# Patient Record
Sex: FEMALE | Race: WHITE | Employment: OTHER | URBAN - METROPOLITAN AREA
[De-identification: names, ages, dates, MRNs, and addresses within clinical notes are randomized per-mention and may not be internally consistent; named-entity substitution may affect disease eponyms.]

---

## 2020-01-09 ENCOUNTER — HOSPITAL ENCOUNTER (INPATIENT)
Age: 70
LOS: 2 days | Discharge: HOME | DRG: 871 | End: 2020-01-11
Attending: EMERGENCY MEDICINE | Admitting: FAMILY MEDICINE
Payer: COMMERCIAL

## 2020-01-09 ENCOUNTER — APPOINTMENT (OUTPATIENT)
Dept: GENERAL RADIOLOGY | Age: 70
DRG: 871 | End: 2020-01-09
Attending: NURSE PRACTITIONER
Payer: COMMERCIAL

## 2020-01-09 ENCOUNTER — APPOINTMENT (OUTPATIENT)
Dept: CT IMAGING | Age: 70
DRG: 871 | End: 2020-01-09
Attending: EMERGENCY MEDICINE
Payer: COMMERCIAL

## 2020-01-09 ENCOUNTER — APPOINTMENT (OUTPATIENT)
Dept: GENERAL RADIOLOGY | Age: 70
DRG: 871 | End: 2020-01-09
Attending: FAMILY MEDICINE
Payer: COMMERCIAL

## 2020-01-09 DIAGNOSIS — N12 PYELONEPHRITIS: ICD-10-CM

## 2020-01-09 DIAGNOSIS — D72.9 NEUTROPHILIA: ICD-10-CM

## 2020-01-09 DIAGNOSIS — N10 ACUTE PYELONEPHRITIS: ICD-10-CM

## 2020-01-09 DIAGNOSIS — R11.2 NON-INTRACTABLE VOMITING WITH NAUSEA, UNSPECIFIED VOMITING TYPE: ICD-10-CM

## 2020-01-09 DIAGNOSIS — R77.8 ELEVATED TROPONIN: Primary | ICD-10-CM

## 2020-01-09 DIAGNOSIS — N28.89 LEFT RENAL MASS: ICD-10-CM

## 2020-01-09 DIAGNOSIS — D72.829 LEUKOCYTOSIS, UNSPECIFIED TYPE: ICD-10-CM

## 2020-01-09 DIAGNOSIS — C79.51 BONE METASTASES (HCC): ICD-10-CM

## 2020-01-09 DIAGNOSIS — R10.30 LOWER ABDOMINAL PAIN: ICD-10-CM

## 2020-01-09 LAB
ALBUMIN SERPL-MCNC: 3 G/DL (ref 3.5–5)
ALBUMIN/GLOB SERPL: 0.8 {RATIO} (ref 1.1–2.2)
ALP SERPL-CCNC: 134 U/L (ref 45–117)
ALT SERPL-CCNC: 36 U/L (ref 12–78)
ANION GAP SERPL CALC-SCNC: 13 MMOL/L (ref 5–15)
APPEARANCE UR: CLEAR
AST SERPL-CCNC: 24 U/L (ref 15–37)
ATRIAL RATE: 96 BPM
BACTERIA URNS QL MICRO: ABNORMAL /HPF
BASOPHILS # BLD: 0 K/UL (ref 0–0.1)
BASOPHILS NFR BLD: 0 % (ref 0–1)
BILIRUB SERPL-MCNC: 0.9 MG/DL (ref 0.2–1)
BILIRUB UR QL: NEGATIVE
BUN SERPL-MCNC: 12 MG/DL (ref 6–20)
BUN/CREAT SERPL: 16 (ref 12–20)
CALCIUM SERPL-MCNC: 8.8 MG/DL (ref 8.5–10.1)
CALCULATED P AXIS, ECG09: 39 DEGREES
CALCULATED R AXIS, ECG10: -28 DEGREES
CALCULATED T AXIS, ECG11: 29 DEGREES
CHLORIDE SERPL-SCNC: 103 MMOL/L (ref 97–108)
CHOLEST SERPL-MCNC: 190 MG/DL
CO2 SERPL-SCNC: 19 MMOL/L (ref 21–32)
COLOR UR: ABNORMAL
CREAT SERPL-MCNC: 0.76 MG/DL (ref 0.55–1.02)
DIAGNOSIS, 93000: NORMAL
DIFFERENTIAL METHOD BLD: ABNORMAL
EOSINOPHIL # BLD: 0 K/UL (ref 0–0.4)
EOSINOPHIL NFR BLD: 0 % (ref 0–7)
EPITH CASTS URNS QL MICRO: ABNORMAL /LPF
ERYTHROCYTE [DISTWIDTH] IN BLOOD BY AUTOMATED COUNT: 13.4 % (ref 11.5–14.5)
FLUAV AG NPH QL IA: NEGATIVE
FLUBV AG NOSE QL IA: NEGATIVE
GLOBULIN SER CALC-MCNC: 3.7 G/DL (ref 2–4)
GLUCOSE SERPL-MCNC: 106 MG/DL (ref 65–100)
GLUCOSE UR STRIP.AUTO-MCNC: NEGATIVE MG/DL
HCT VFR BLD AUTO: 44 % (ref 35–47)
HDLC SERPL-MCNC: 42 MG/DL
HDLC SERPL: 4.5 {RATIO} (ref 0–5)
HGB BLD-MCNC: 14.8 G/DL (ref 11.5–16)
HGB UR QL STRIP: ABNORMAL
HYALINE CASTS URNS QL MICRO: ABNORMAL /LPF (ref 0–5)
IMM GRANULOCYTES # BLD AUTO: 0 K/UL (ref 0–0.04)
IMM GRANULOCYTES NFR BLD AUTO: 0 % (ref 0–0.5)
KETONES UR QL STRIP.AUTO: 40 MG/DL
LACTATE SERPL-SCNC: 1.1 MMOL/L (ref 0.4–2)
LDLC SERPL CALC-MCNC: 124.6 MG/DL (ref 0–100)
LEUKOCYTE ESTERASE UR QL STRIP.AUTO: ABNORMAL
LIPASE SERPL-CCNC: 68 U/L (ref 73–393)
LIPID PROFILE,FLP: ABNORMAL
LYMPHOCYTES # BLD: 2.1 K/UL (ref 0.8–3.5)
LYMPHOCYTES NFR BLD: 8 % (ref 12–49)
MCH RBC QN AUTO: 29.8 PG (ref 26–34)
MCHC RBC AUTO-ENTMCNC: 33.6 G/DL (ref 30–36.5)
MCV RBC AUTO: 88.5 FL (ref 80–99)
MONOCYTES # BLD: 1.8 K/UL (ref 0–1)
MONOCYTES NFR BLD: 7 % (ref 5–13)
NEUTS BAND NFR BLD MANUAL: 2 %
NEUTS SEG # BLD: 22.1 K/UL (ref 1.8–8)
NEUTS SEG NFR BLD: 83 % (ref 32–75)
NITRITE UR QL STRIP.AUTO: POSITIVE
NRBC # BLD: 0 K/UL (ref 0–0.01)
NRBC BLD-RTO: 0 PER 100 WBC
P-R INTERVAL, ECG05: 146 MS
PH UR STRIP: 5.5 [PH] (ref 5–8)
PLATELET # BLD AUTO: 400 K/UL (ref 150–400)
PMV BLD AUTO: 10.2 FL (ref 8.9–12.9)
POTASSIUM SERPL-SCNC: 4 MMOL/L (ref 3.5–5.1)
PROT SERPL-MCNC: 6.7 G/DL (ref 6.4–8.2)
PROT UR STRIP-MCNC: 30 MG/DL
Q-T INTERVAL, ECG07: 320 MS
QRS DURATION, ECG06: 82 MS
QTC CALCULATION (BEZET), ECG08: 404 MS
RBC # BLD AUTO: 4.97 M/UL (ref 3.8–5.2)
RBC #/AREA URNS HPF: ABNORMAL /HPF (ref 0–5)
RBC MORPH BLD: ABNORMAL
SODIUM SERPL-SCNC: 135 MMOL/L (ref 136–145)
SP GR UR REFRACTOMETRY: 1 (ref 1–1.03)
TRIGL SERPL-MCNC: 117 MG/DL (ref ?–150)
TROPONIN I SERPL-MCNC: 0.07 NG/ML
TROPONIN I SERPL-MCNC: 0.12 NG/ML
UROBILINOGEN UR QL STRIP.AUTO: 0.2 EU/DL (ref 0.2–1)
VENTRICULAR RATE, ECG03: 96 BPM
VLDLC SERPL CALC-MCNC: 23.4 MG/DL
WBC # BLD AUTO: 26 K/UL (ref 3.6–11)
WBC URNS QL MICRO: >100 /HPF (ref 0–4)

## 2020-01-09 PROCEDURE — 84484 ASSAY OF TROPONIN QUANT: CPT

## 2020-01-09 PROCEDURE — 93005 ELECTROCARDIOGRAM TRACING: CPT

## 2020-01-09 PROCEDURE — 74011250636 HC RX REV CODE- 250/636: Performed by: NURSE PRACTITIONER

## 2020-01-09 PROCEDURE — 70220 X-RAY EXAM OF SINUSES: CPT

## 2020-01-09 PROCEDURE — 83605 ASSAY OF LACTIC ACID: CPT

## 2020-01-09 PROCEDURE — 74177 CT ABD & PELVIS W/CONTRAST: CPT

## 2020-01-09 PROCEDURE — 87804 INFLUENZA ASSAY W/OPTIC: CPT

## 2020-01-09 PROCEDURE — 85025 COMPLETE CBC W/AUTO DIFF WBC: CPT

## 2020-01-09 PROCEDURE — 80053 COMPREHEN METABOLIC PANEL: CPT

## 2020-01-09 PROCEDURE — 99284 EMERGENCY DEPT VISIT MOD MDM: CPT

## 2020-01-09 PROCEDURE — 0100U RESPIRATORY PANEL,PCR,NASOPHARYNGEAL: CPT

## 2020-01-09 PROCEDURE — 74011250637 HC RX REV CODE- 250/637: Performed by: FAMILY MEDICINE

## 2020-01-09 PROCEDURE — 87077 CULTURE AEROBIC IDENTIFY: CPT

## 2020-01-09 PROCEDURE — 74011250636 HC RX REV CODE- 250/636: Performed by: EMERGENCY MEDICINE

## 2020-01-09 PROCEDURE — 74011636320 HC RX REV CODE- 636/320: Performed by: INTERNAL MEDICINE

## 2020-01-09 PROCEDURE — 74011250636 HC RX REV CODE- 250/636: Performed by: FAMILY MEDICINE

## 2020-01-09 PROCEDURE — 80061 LIPID PANEL: CPT

## 2020-01-09 PROCEDURE — 74011000250 HC RX REV CODE- 250: Performed by: EMERGENCY MEDICINE

## 2020-01-09 PROCEDURE — 87086 URINE CULTURE/COLONY COUNT: CPT

## 2020-01-09 PROCEDURE — 36415 COLL VENOUS BLD VENIPUNCTURE: CPT

## 2020-01-09 PROCEDURE — 87186 SC STD MICRODIL/AGAR DIL: CPT

## 2020-01-09 PROCEDURE — 83690 ASSAY OF LIPASE: CPT

## 2020-01-09 PROCEDURE — 71046 X-RAY EXAM CHEST 2 VIEWS: CPT

## 2020-01-09 PROCEDURE — 65270000029 HC RM PRIVATE

## 2020-01-09 PROCEDURE — 87040 BLOOD CULTURE FOR BACTERIA: CPT

## 2020-01-09 PROCEDURE — 81001 URINALYSIS AUTO W/SCOPE: CPT

## 2020-01-09 RX ORDER — SODIUM CHLORIDE 0.9 % (FLUSH) 0.9 %
5-40 SYRINGE (ML) INJECTION AS NEEDED
Status: DISCONTINUED | OUTPATIENT
Start: 2020-01-09 | End: 2020-01-11 | Stop reason: HOSPADM

## 2020-01-09 RX ORDER — GUAIFENESIN 100 MG/5ML
81 LIQUID (ML) ORAL DAILY
Status: DISCONTINUED | OUTPATIENT
Start: 2020-01-09 | End: 2020-01-11 | Stop reason: HOSPADM

## 2020-01-09 RX ORDER — ONDANSETRON 2 MG/ML
4 INJECTION INTRAMUSCULAR; INTRAVENOUS
Status: DISCONTINUED | OUTPATIENT
Start: 2020-01-09 | End: 2020-01-11 | Stop reason: HOSPADM

## 2020-01-09 RX ORDER — SODIUM CHLORIDE 9 MG/ML
75 INJECTION, SOLUTION INTRAVENOUS CONTINUOUS
Status: DISCONTINUED | OUTPATIENT
Start: 2020-01-09 | End: 2020-01-11 | Stop reason: HOSPADM

## 2020-01-09 RX ORDER — SODIUM CHLORIDE 0.9 % (FLUSH) 0.9 %
5-40 SYRINGE (ML) INJECTION EVERY 8 HOURS
Status: DISCONTINUED | OUTPATIENT
Start: 2020-01-09 | End: 2020-01-11 | Stop reason: HOSPADM

## 2020-01-09 RX ORDER — HEPARIN SODIUM 5000 [USP'U]/ML
5000 INJECTION, SOLUTION INTRAVENOUS; SUBCUTANEOUS EVERY 8 HOURS
Status: DISCONTINUED | OUTPATIENT
Start: 2020-01-09 | End: 2020-01-11 | Stop reason: HOSPADM

## 2020-01-09 RX ORDER — GUAIFENESIN 600 MG/1
600 TABLET, EXTENDED RELEASE ORAL EVERY 12 HOURS
Status: DISCONTINUED | OUTPATIENT
Start: 2020-01-09 | End: 2020-01-11 | Stop reason: HOSPADM

## 2020-01-09 RX ORDER — PSEUDOEPHEDRINE HCL 30 MG
60 TABLET ORAL
COMMUNITY

## 2020-01-09 RX ORDER — ACETAMINOPHEN 325 MG/1
650 TABLET ORAL
Status: DISCONTINUED | OUTPATIENT
Start: 2020-01-09 | End: 2020-01-11 | Stop reason: HOSPADM

## 2020-01-09 RX ADMIN — ONDANSETRON 4 MG: 2 INJECTION INTRAMUSCULAR; INTRAVENOUS at 20:57

## 2020-01-09 RX ADMIN — IOPAMIDOL 100 ML: 755 INJECTION, SOLUTION INTRAVENOUS at 16:22

## 2020-01-09 RX ADMIN — SODIUM CHLORIDE 1000 ML: 900 INJECTION, SOLUTION INTRAVENOUS at 15:44

## 2020-01-09 RX ADMIN — WATER 1 G: 1 INJECTION INTRAMUSCULAR; INTRAVENOUS; SUBCUTANEOUS at 19:02

## 2020-01-09 RX ADMIN — ASPIRIN 81 MG 81 MG: 81 TABLET ORAL at 19:02

## 2020-01-09 NOTE — ED NOTES
2:59 PM  I have evaluated the patient as the Provider in Triage. I have reviewed Her vital signs and the triage nurse assessment. I have talked with the patient and any available family and advised that I am the provider in triage and have ordered the appropriate study to initiate their work up based on the clinical presentation during my assessment. I have advised that the patient will be accommodated in the Main ED as soon as possible. I have also requested to contact the triage nurse or myself immediately if the patient experiences any changes in their condition during this brief waiting period. C/o fever, chills, nausea and vomiting. States fever started two weeks ago and now started with n/v/d yesterday. Had flu shot. No ill contacts. Positive SOB, no chest pain.   Beulah Bender, NP

## 2020-01-09 NOTE — ED PROVIDER NOTES
81 y.o. female with no significant past medical history who presents from home with chief complaint of fever. Patient reports her fever onset ~2 weeks ago. Patient endorses accompanying SOB, dizziness, chills, productive cough, and states today she had onset of diarrhea (4x episodes), loss of appetite, and vomiting. Patient notes she also has been fighting a sinus infx for ~4 months, reporting she sees ENT on Monday (in ~4 days). Patient denies recent sick contacts. Patient denies use of anticoagulants. Patient denies urgency, frequency, chest pain, and abdominal pain. There are no other acute medical concerns at this time. Note written by Nedra Huggins, as dictated by Acacia Ashraf NP 2:55 PM     I have evaluated the patient as the Provider in Triage. I have reviewed Her vital signs and the triage nurse assessment. I have talked with the patient and any available family and advised that I am the provider in triage and have ordered the appropriate study to initiate their work up based on the clinical presentation during my assessment. I have advised that the patient will be accommodated in the Main ED as soon as possible. I have also requested to contact the triage nurse or myself immediately if the patient experiences any changes in their condition during this brief waiting period. Note written by Nedra Huggins, as dictated by Acacia Ashraf NP 2:55 PM     ---  81 y.o. female with no significant past medical history who presents from home with chief complaint of Fever. Pt c/o a fever that has been present for three days since Tuesday (Tmax 101) with associated chills, nausea, vomiting, diarrhea, abdominal pain, decreased appetite, and decreased PO intake. Pt describes her diarrhea as watery and yellow and reports four episodes today. Pt states that her abdominal pain in diffuse. Pt mentions sinus drainage for four months with associated cough.  Pt reports taking two courses of antibiotics and a course of prednisone prescribed at [facility] with no improvement in symptoms. Pt states that she has an appointment scheduled with an ENT for 1/13/2020. Pt denies any recent sick contact or travel. Pt denies any other pain. There are no other acute medical concerns at this time. Surgical Hx: Hx cholecystectomy. PCP: No primary care provider on file. Note written by Nedra Lopez, as dictated by Santiago Boucher MD 4:04 PM        The history is provided by the patient. No  was used. No past medical history on file. No past surgical history on file. No family history on file.     Social History     Socioeconomic History    Marital status: Not on file     Spouse name: Not on file    Number of children: Not on file    Years of education: Not on file    Highest education level: Not on file   Occupational History    Not on file   Social Needs    Financial resource strain: Not on file    Food insecurity:     Worry: Not on file     Inability: Not on file    Transportation needs:     Medical: Not on file     Non-medical: Not on file   Tobacco Use    Smoking status: Not on file   Substance and Sexual Activity    Alcohol use: Not on file    Drug use: Not on file    Sexual activity: Not on file   Lifestyle    Physical activity:     Days per week: Not on file     Minutes per session: Not on file    Stress: Not on file   Relationships    Social connections:     Talks on phone: Not on file     Gets together: Not on file     Attends Je[employer] witness service: Not on file     Active member of club or organization: Not on file     Attends meetings of clubs or organizations: Not on file     Relationship status: Not on file    Intimate partner violence:     Fear of current or ex partner: Not on file     Emotionally abused: Not on file     Physically abused: Not on file     Forced sexual activity: Not on file   Other Topics Concern    Not on file   Social History Narrative    Not on file         ALLERGIES: Patient has no allergy information on record. Review of Systems   Constitutional: Positive for appetite change, chills and fever. HENT: Negative for drooling, nosebleeds and sinus pain. Eyes: Negative for pain and itching. Respiratory: Negative for choking and stridor. Cardiovascular: Negative for chest pain and leg swelling. Gastrointestinal: Positive for abdominal pain, diarrhea, nausea and vomiting. Negative for abdominal distention and rectal pain. Endocrine: Negative for heat intolerance and polyphagia. Genitourinary: Negative for enuresis and genital sores. Musculoskeletal: Negative for arthralgias and joint swelling. Skin: Negative for color change. Allergic/Immunologic: Negative for immunocompromised state. Neurological: Negative for tremors, speech difficulty and headaches. Hematological: Negative for adenopathy. Psychiatric/Behavioral: Negative for dysphoric mood and sleep disturbance. There were no vitals filed for this visit. Physical Exam  Vitals signs and nursing note reviewed. Constitutional:       Appearance: She is well-developed. HENT:      Head: Normocephalic. Nose: Nose normal.   Eyes:      Conjunctiva/sclera: Conjunctivae normal.   Neck:      Musculoskeletal: Normal range of motion and neck supple. Cardiovascular:      Rate and Rhythm: Regular rhythm. Heart sounds: Normal heart sounds. Pulmonary:      Effort: Pulmonary effort is normal. No respiratory distress. Abdominal:      General: There is no distension. Palpations: Abdomen is soft. Tenderness: There is tenderness in the right lower quadrant and left lower quadrant. Comments: Diffuse tenderness to palpation in the lower abdomen, non-peritoneal   Musculoskeletal: Normal range of motion. General: No deformity. Skin:     General: Skin is warm and dry. Neurological:      Mental Status: She is alert. Coordination: Coordination normal.   Psychiatric:         Behavior: Behavior normal.     Note written by Nedra Camacho, as dictated by Barbara Bhagat MD 4:04 PM    MDM  Number of Diagnoses or Management Options  Elevated troponin:   Left renal mass:   Leukocytosis, unspecified type:   Lower abdominal pain:   Non-intractable vomiting with nausea, unspecified vomiting type:   Pyelonephritis:   Diagnosis management comments: Pt is fatigued appearing, AAO x 4 notes N/V/D and lower abdominal pain. Denying vaginal or urinary symptoms to me. Fever yesterday but afebrile in ED, non toxic appearing. No acute ischemia on EKG and pt did not c/o chest pain during initial evaluation. When questioned after elevated troponin noted, she said she felt chest pain with cough earlier in week. CT scan results discussed with her. IV abx for leukocytosis with ?pyelonephritis. Will be admitted for further management. ED Course as of Jan 09 1745   Thu Jan 09, 2020   1521 ED EKG interpretation:  Rhythm: normal sinus rhythm; and regular . Rate (approx.): 96; Axis: normal; ST/T wave: normal; No evidence of acute coronary ischemia. [AL]   9763 Pt denies any chest pain while in the ED. No increased WOB. No acute ischemia on EKG. Elevated troponin noted. Discussed with radiologist. ?renal carcinoma vs pyelonephritis noted on CT scan. [AL]   5274 Pt notes currently on steroids for sinus infection per urgent care. Leukocytosis noted. [AL]      ED Course User Index  [AL] Barbara Bhagat MD       Procedures    Hospitalist Perfect Serve for Admission  5:45 PM    ED Room Number: ER03/03  Patient Name and age:  Aileen Montaño 81 y.o.  female  Working Diagnosis:   1. Elevated troponin    2. Leukocytosis, unspecified type    3. Non-intractable vomiting with nausea, unspecified vomiting type    4. Lower abdominal pain    5. Pyelonephritis    6.  Left renal mass      Readmission: no  Isolation Requirements:  no  Recommended Level of Care:  telemetry  Code Status:  Full Code  Department:[facility] ED - [phone]  Other:  Pt with N/V/D with abdominal pain that began this week. Chest pain with cough. No pain today. No prior hx of CAD. CT scan of abdomen with renal mass vs pyelonephritis. Leukocytosis noted, IV ceftriaxone administered. UA pending. IVF and pt HD stable. Patient is being admitted to the hospital.  The results of their tests and reasons for their admission have been discussed with them and/or available family. They convey agreement and understanding for the need to be admitted and for their admission diagnosis.

## 2020-01-09 NOTE — PROGRESS NOTES
Pt presented with fever, chills, N/V/D, Flank pain  Abd CT suspicious for renal cell CA with mets    A/P  Pyelonephritis : IV abx, fluids, urine Cx  Renal mass: supportive care, Urology consult  Elevated trop: likely demand, echo, asa, tele  Diarrhea: recent abx, r/o C diff     See dictated H and P for details    Job no # [id number]

## 2020-01-09 NOTE — ED TRIAGE NOTES
C/o of fever starting monday with vomiting and diarrhea starting today. Pt adds intermitent fevers for 2 weeks with sinus infection x 4 mo. Has appt with ENT.

## 2020-01-09 NOTE — PROGRESS NOTES
1/9/2020  6:23 PM  Case management note    Reason for Admission:   Fever, N/V  Patient has been sick for over a week, had N/V, diarrhea and SOB  [facility] [address]                   RRAT Score:      0               Plan for utilizing home health:      Patient is independent and will most likely not qualify for home health services                    Current Advanced Directive/Advance Care Plan: no advance care planning                         Transition of Care Plan:                      1. Home with family assistance  2. PCP (needs a new one) practice change  3. AD planning  4.  CM to follow until discharge    Care Management Interventions  PCP Verified by CM: Yes(no pcp ( needs one))  Mode of Transport at Discharge: Self  Transition of Care Consult (CM Consult): Discharge Planning  Current Support Network: Lives with Spouse  Confirm Follow Up Transport: Family  The Plan for Transition of Care is Related to the Following Treatment Goals : fever  Discharge Location  Discharge Placement: Home with family assistance  Patricia Rasmussen, [address]

## 2020-01-09 NOTE — PROGRESS NOTES
Admission Medication Reconciliation:    Comments/Recommendations:  -Medication history obtained in the emergency department (room 03)  -Confirmed NKDA and added preferred pharmacy location  -Patient was prescribed 10 day course of doxycycline on 10/27/19 and 10 day course of Augmentin on 11/29/19 and has received multiple prescriptions for benzonatate, Medrol dose packs, and nasal sprays (most recently azelastine) to help treat current sinus infection. She reports no chronic maintenance medications    Medications added: Sudafed  Medications removed: None  Medications changed: None    Information obtained from: Patient, review of RxQuery, chart review    Significant PMH/Disease States:   No past medical history on file. Chief Complaint for this Admission:    Chief Complaint   Patient presents with    Vomiting    Diarrhea       Allergies:  Patient has no known allergies. Prior to Admission Medications:   Prior to Admission Medications   Prescriptions Last Dose Informant Patient Reported? Taking?   pseudoephedrine (SUDAFED) 30 mg tablet 1/9/2020 at AM  Yes Yes   Sig: Take 60 mg by mouth every six (6) hours as needed for Congestion.       Facility-Administered Medications: None       Thank you,  Tiff Stanford, ROSIOD

## 2020-01-10 ENCOUNTER — APPOINTMENT (OUTPATIENT)
Dept: NON INVASIVE DIAGNOSTICS | Age: 70
DRG: 871 | End: 2020-01-10
Attending: FAMILY MEDICINE
Payer: COMMERCIAL

## 2020-01-10 PROBLEM — R19.7 NAUSEA VOMITING AND DIARRHEA: Status: ACTIVE | Noted: 2020-01-09

## 2020-01-10 PROBLEM — N39.0 UTI (URINARY TRACT INFECTION): Status: ACTIVE | Noted: 2020-01-10

## 2020-01-10 PROBLEM — D72.829 LEUKOCYTOSIS: Status: ACTIVE | Noted: 2020-01-10

## 2020-01-10 PROBLEM — N12 PYELONEPHRITIS: Status: ACTIVE | Noted: 2020-01-10

## 2020-01-10 PROBLEM — C79.51 BONE METASTASES (HCC): Status: ACTIVE | Noted: 2020-01-10

## 2020-01-10 PROBLEM — C78.7 LIVER METASTASES (HCC): Status: ACTIVE | Noted: 2020-01-10

## 2020-01-10 PROBLEM — A41.9 SEPSIS (HCC): Status: ACTIVE | Noted: 2020-01-10

## 2020-01-10 PROBLEM — N32.89 BLADDER MASS: Status: ACTIVE | Noted: 2020-01-10

## 2020-01-10 PROBLEM — R50.9 FEVER: Status: ACTIVE | Noted: 2020-01-10

## 2020-01-10 PROBLEM — I21.A1 TYPE 2 ACUTE MYOCARDIAL INFARCTION (HCC): Status: ACTIVE | Noted: 2020-01-10

## 2020-01-10 PROBLEM — E66.9 OBESE: Status: ACTIVE | Noted: 2020-01-10

## 2020-01-10 LAB
ANION GAP SERPL CALC-SCNC: 6 MMOL/L (ref 5–15)
AV PEAK GRADIENT: 18.34 MMHG
AV VELOCITY RATIO: 0.64
B PERT DNA SPEC QL NAA+PROBE: NOT DETECTED
BASOPHILS # BLD: 0.1 K/UL (ref 0–0.1)
BASOPHILS NFR BLD: 0 % (ref 0–1)
BORDETELLA PARAPERTUSSIS PCR, BORPAR: NOT DETECTED
BUN SERPL-MCNC: 10 MG/DL (ref 6–20)
BUN/CREAT SERPL: 12 (ref 12–20)
C PNEUM DNA SPEC QL NAA+PROBE: NOT DETECTED
CALCIUM SERPL-MCNC: 8.7 MG/DL (ref 8.5–10.1)
CHLORIDE SERPL-SCNC: 104 MMOL/L (ref 97–108)
CO2 SERPL-SCNC: 27 MMOL/L (ref 21–32)
CREAT SERPL-MCNC: 0.84 MG/DL (ref 0.55–1.02)
DIFFERENTIAL METHOD BLD: ABNORMAL
ECHO AO ASC DIAM: 3.45 CM
ECHO AO ROOT DIAM: 3.31 CM
ECHO AV AREA PEAK VELOCITY: 2.6 CM2
ECHO AV AREA/BSA PEAK VELOCITY: 1.2 CM2/M2
ECHO AV PEAK GRADIENT: 12.2 MMHG
ECHO AV PEAK VELOCITY: 174.49 CM/S
ECHO AV REGURGITANT PHT: 416.2 CM
ECHO LA AREA 4C: 14.1 CM2
ECHO LA MAJOR AXIS: 2.99 CM
ECHO LA TO AORTIC ROOT RATIO: 0.91
ECHO LA VOL 2C: 46.38 ML (ref 22–52)
ECHO LA VOL 4C: 31.67 ML (ref 22–52)
ECHO LA VOL BP: 39.9 ML (ref 22–52)
ECHO LA VOL/BSA BIPLANE: 19.4 ML/M2 (ref 16–28)
ECHO LA VOLUME INDEX A2C: 22.55 ML/M2 (ref 16–28)
ECHO LA VOLUME INDEX A4C: 15.4 ML/M2 (ref 16–28)
ECHO LV E' LATERAL VELOCITY: 9.77 CENTIMETER/SECOND
ECHO LV E' SEPTAL VELOCITY: 6.88 CENTIMETER/SECOND
ECHO LV EDV TEICHHOLZ: 0.7 ML
ECHO LV ESV TEICHHOLZ: 0.27 ML
ECHO LV INTERNAL DIMENSION DIASTOLIC: 4.93 CM (ref 3.9–5.3)
ECHO LV INTERNAL DIMENSION SYSTOLIC: 3.3 CM
ECHO LV IVSD: 1.07 CM (ref 0.6–0.9)
ECHO LV MASS 2D: 216.4 G (ref 67–162)
ECHO LV MASS INDEX 2D: 105.2 G/M2 (ref 43–95)
ECHO LV POSTERIOR WALL DIASTOLIC: 0.99 CM (ref 0.6–0.9)
ECHO LVOT DIAM: 2.28 CM
ECHO LVOT PEAK GRADIENT: 5 MMHG
ECHO LVOT PEAK VELOCITY: 111.62 CM/S
ECHO LVOT SV: 96.7 ML
ECHO LVOT VTI: 23.63 CM
ECHO MV A VELOCITY: 117.37 CM/S
ECHO MV AREA PHT: 3.9 CM2
ECHO MV E DECELERATION TIME (DT): 192.2 MS
ECHO MV E VELOCITY: 79.32 CM/S
ECHO MV E/A RATIO: 0.68
ECHO MV PRESSURE HALF TIME (PHT): 55.7 MS
ECHO RV INTERNAL DIMENSION: 3.99 CM
ECHO RV TAPSE: 2.27 CM (ref 1.5–2)
ECHO TV REGURGITANT MAX VELOCITY: 239.61 CM/S
ECHO TV REGURGITANT PEAK GRADIENT: 23 MMHG
EOSINOPHIL # BLD: 0.1 K/UL (ref 0–0.4)
EOSINOPHIL NFR BLD: 0 % (ref 0–7)
ERYTHROCYTE [DISTWIDTH] IN BLOOD BY AUTOMATED COUNT: 13.6 % (ref 11.5–14.5)
FLUAV H1 2009 PAND RNA SPEC QL NAA+PROBE: NOT DETECTED
FLUAV H1 RNA SPEC QL NAA+PROBE: NOT DETECTED
FLUAV H3 RNA SPEC QL NAA+PROBE: NOT DETECTED
FLUAV SUBTYP SPEC NAA+PROBE: NOT DETECTED
FLUBV RNA SPEC QL NAA+PROBE: NOT DETECTED
GLUCOSE SERPL-MCNC: 102 MG/DL (ref 65–100)
HADV DNA SPEC QL NAA+PROBE: NOT DETECTED
HCOV 229E RNA SPEC QL NAA+PROBE: NOT DETECTED
HCOV HKU1 RNA SPEC QL NAA+PROBE: NOT DETECTED
HCOV NL63 RNA SPEC QL NAA+PROBE: NOT DETECTED
HCOV OC43 RNA SPEC QL NAA+PROBE: NOT DETECTED
HCT VFR BLD AUTO: 39.4 % (ref 35–47)
HGB BLD-MCNC: 12.7 G/DL (ref 11.5–16)
HMPV RNA SPEC QL NAA+PROBE: NOT DETECTED
HPIV1 RNA SPEC QL NAA+PROBE: NOT DETECTED
HPIV2 RNA SPEC QL NAA+PROBE: NOT DETECTED
HPIV3 RNA SPEC QL NAA+PROBE: NOT DETECTED
HPIV4 RNA SPEC QL NAA+PROBE: NOT DETECTED
IMM GRANULOCYTES # BLD AUTO: 0.3 K/UL (ref 0–0.04)
IMM GRANULOCYTES NFR BLD AUTO: 1 % (ref 0–0.5)
LVFS 2D: 33.06 %
LVOT MG: 2.44 MMHG
LVOT MV: 0.72 CM/S
LVSV (TEICH): 32.68 ML
LYMPHOCYTES # BLD: 1.8 K/UL (ref 0.8–3.5)
LYMPHOCYTES NFR BLD: 9 % (ref 12–49)
M PNEUMO DNA SPEC QL NAA+PROBE: NOT DETECTED
MCH RBC QN AUTO: 29.4 PG (ref 26–34)
MCHC RBC AUTO-ENTMCNC: 32.2 G/DL (ref 30–36.5)
MCV RBC AUTO: 91.2 FL (ref 80–99)
MONOCYTES # BLD: 1.9 K/UL (ref 0–1)
MONOCYTES NFR BLD: 10 % (ref 5–13)
MV DEC SLOPE: 4.13
NEUTS SEG # BLD: 15.9 K/UL (ref 1.8–8)
NEUTS SEG NFR BLD: 80 % (ref 32–75)
NRBC # BLD: 0 K/UL (ref 0–0.01)
NRBC BLD-RTO: 0 PER 100 WBC
PISA AR MAX VEL: 214.15 CM/S
PLATELET # BLD AUTO: 310 K/UL (ref 150–400)
PMV BLD AUTO: 10 FL (ref 8.9–12.9)
POTASSIUM SERPL-SCNC: 3.5 MMOL/L (ref 3.5–5.1)
RBC # BLD AUTO: 4.32 M/UL (ref 3.8–5.2)
RSV RNA SPEC QL NAA+PROBE: NOT DETECTED
RV+EV RNA SPEC QL NAA+PROBE: NOT DETECTED
SODIUM SERPL-SCNC: 137 MMOL/L (ref 136–145)
TROPONIN I SERPL-MCNC: 0.11 NG/ML
WBC # BLD AUTO: 20 K/UL (ref 3.6–11)

## 2020-01-10 PROCEDURE — 74011250637 HC RX REV CODE- 250/637: Performed by: FAMILY MEDICINE

## 2020-01-10 PROCEDURE — 65270000029 HC RM PRIVATE

## 2020-01-10 PROCEDURE — 74011250636 HC RX REV CODE- 250/636: Performed by: INTERNAL MEDICINE

## 2020-01-10 PROCEDURE — 74011000258 HC RX REV CODE- 258: Performed by: INTERNAL MEDICINE

## 2020-01-10 PROCEDURE — 74011250636 HC RX REV CODE- 250/636: Performed by: FAMILY MEDICINE

## 2020-01-10 PROCEDURE — 85025 COMPLETE CBC W/AUTO DIFF WBC: CPT

## 2020-01-10 PROCEDURE — 80048 BASIC METABOLIC PNL TOTAL CA: CPT

## 2020-01-10 PROCEDURE — 84484 ASSAY OF TROPONIN QUANT: CPT

## 2020-01-10 PROCEDURE — 93306 TTE W/DOPPLER COMPLETE: CPT

## 2020-01-10 PROCEDURE — 36415 COLL VENOUS BLD VENIPUNCTURE: CPT

## 2020-01-10 PROCEDURE — 74011250637 HC RX REV CODE- 250/637: Performed by: INTERNAL MEDICINE

## 2020-01-10 RX ADMIN — GUAIFENESIN 600 MG: 600 TABLET, EXTENDED RELEASE ORAL at 01:38

## 2020-01-10 RX ADMIN — Medication 10 ML: at 01:40

## 2020-01-10 RX ADMIN — Medication 10 ML: at 11:49

## 2020-01-10 RX ADMIN — ASPIRIN 81 MG 81 MG: 81 TABLET ORAL at 10:58

## 2020-01-10 RX ADMIN — SODIUM CHLORIDE 75 ML/HR: 900 INJECTION, SOLUTION INTRAVENOUS at 01:40

## 2020-01-10 RX ADMIN — CEFTRIAXONE SODIUM 1 G: 1 INJECTION, POWDER, FOR SOLUTION INTRAVENOUS at 17:26

## 2020-01-10 RX ADMIN — HEPARIN SODIUM 5000 UNITS: 5000 INJECTION INTRAVENOUS; SUBCUTANEOUS at 10:57

## 2020-01-10 RX ADMIN — GUAIFENESIN 600 MG: 600 TABLET, EXTENDED RELEASE ORAL at 10:58

## 2020-01-10 RX ADMIN — HEPARIN SODIUM 5000 UNITS: 5000 INJECTION INTRAVENOUS; SUBCUTANEOUS at 01:38

## 2020-01-10 RX ADMIN — GUAIFENESIN 600 MG: 600 TABLET, EXTENDED RELEASE ORAL at 22:27

## 2020-01-10 RX ADMIN — Medication 10 ML: at 22:27

## 2020-01-10 RX ADMIN — ONDANSETRON 4 MG: 2 INJECTION INTRAMUSCULAR; INTRAVENOUS at 10:58

## 2020-01-10 RX ADMIN — SODIUM CHLORIDE 75 ML/HR: 900 INJECTION, SOLUTION INTRAVENOUS at 22:26

## 2020-01-10 RX ADMIN — SODIUM CHLORIDE 75 ML/HR: 900 INJECTION, SOLUTION INTRAVENOUS at 10:58

## 2020-01-10 NOTE — ED NOTES
TRANSFER - OUT REPORT:    Verbal report given to Shannon(name) on [employer]  being transferred to [facility](unit) for routine progression of care       Report consisted of patients Situation, Background, Assessment and   Recommendations(SBAR). Information from the following report(s) SBAR, Kardex, ED Summary, Intake/Output, MAR, Recent Results and Cardiac Rhythm SR was reviewed with the receiving nurse. Lines:   Peripheral IV 01/09/20 Left Forearm (Active)   Site Assessment Clean, dry, & intact 1/9/2020  3:10 PM   Phlebitis Assessment 0 1/9/2020  3:10 PM   Infiltration Assessment 0 1/9/2020  3:10 PM   Dressing Status Clean, dry, & intact 1/9/2020  3:10 PM   Dressing Type Tape;Transparent 1/9/2020  3:10 PM   Hub Color/Line Status Pink;Flushed;Patent 1/9/2020  3:10 PM   Action Taken Blood drawn 1/9/2020  3:10 PM       Peripheral IV 01/09/20 Right Antecubital (Active)   Site Assessment Clean, dry, & intact 1/9/2020 10:02 PM   Phlebitis Assessment 0 1/9/2020 10:02 PM   Infiltration Assessment 0 1/9/2020 10:02 PM   Dressing Status Clean, dry, & intact 1/9/2020 10:02 PM   Dressing Type Transparent 1/9/2020 10:02 PM   Hub Color/Line Status Pink 1/9/2020 10:02 PM   Action Taken Dressing reinforced;Blood drawn 1/9/2020 10:02 PM        Opportunity for questions and clarification was provided.       Patient transported with:   Monitor  Registered Nurse

## 2020-01-10 NOTE — PROGRESS NOTES
[facility]    Medical Progress Note      NAME: Jessica Khan   :  1950  MRM:  [id number]    Date/Time: 1/10/2020  11:57 AM          Assessment and Plan:     Pyelonephritis / UTI (urinary tract infection) - POA, unclear if related to a cancer. Awaiting urine and blood cx. Monitor. Ceftriaxone for now. Sepsis / Fever / Leukocytosis - POA, presumed due to UTI. DDx paraneoplastic. NOT severe sepsis. IVF, Abx, monitor cx. Renal mass / Bone metastases / Liver metastases - Noted abnormal findings on CT scan. Grave concern of stage 4 kidney cancer. Oncology consult. They can plan further Bx and imaging as needed. Type 2 acute myocardial infarction - Mildly elevated troponin is likely strain due to uti and sepsis. Monitor. ECHO. Would defer any further workup pending urology on onc plans. Nausea vomiting and diarrhea - POA due to UTI and or cancer. Supportive care. IVF. Zofran. Diet as tolerated    Obese - Advise weight loss       Subjective:     Chief Complaint:  Abd pain better    ROS:  (bold if positive, if negative)    Tolerating some PT  Tolerating Diet        Objective:     Last 24hrs VS reviewed since prior progress note.  Most recent are:    Visit Vitals  /81   Pulse 82   Temp 98.5 °F (36.9 °C)   Resp 21   Ht 5' 4\" (1.626 m)   Wt 102.1 kg (225 lb 1.4 oz)   SpO2 98%   BMI 38.64 kg/m²     SpO2 Readings from Last 6 Encounters:   01/10/20 98%            Intake/Output Summary (Last 24 hours) at 1/10/2020 1104  Last data filed at 2020 1644  Gross per 24 hour   Intake 1000 ml   Output    Net 1000 ml        Physical Exam:    Gen:  Obese, in no acute distress  HEENT:  Pink conjunctivae, PERRL, hearing intact to voice, moist mucous membranes  Neck:  Supple, without masses, thyroid non-tender  Resp:  No accessory muscle use, clear breath sounds without wheezes rales or rhonchi  Card:  No murmurs, normal S1, S2 without thrills, bruits or peripheral edema  Abd:  Soft, non-tender, non-distended, normoactive bowel sounds are present, no mass  Lymph:  No cervical or inguinal adenopathy  Musc:  No cyanosis or clubbing  Skin:  No rashes or ulcers, skin turgor is good  Neuro:  Cranial nerves are grossly intact, no focal motor weakness, follows commands appropriately  Psych:  Good insight, oriented to person, place and time, alert    Telemetry reviewed:   normal sinus rhythm  __________________________________________________________________  Medications Reviewed: (see below)  Medications:     Current Facility-Administered Medications   Medication Dose Route Frequency    sodium chloride (NS) flush 5-40 mL  5-40 mL IntraVENous Q8H    sodium chloride (NS) flush 5-40 mL  5-40 mL IntraVENous PRN    0.9% sodium chloride infusion  75 mL/hr IntraVENous CONTINUOUS    cefTRIAXone (ROCEPHIN) 1 g in 0.9% sodium chloride (MBP/ADV) 50 mL ADV  1 g IntraVENous Q24H    acetaminophen (TYLENOL) tablet 650 mg  650 mg Oral Q4H PRN    heparin (porcine) injection 5,000 Units  5,000 Units SubCUTAneous Q8H    aspirin chewable tablet 81 mg  81 mg Oral DAILY    guaiFENesin ER (MUCINEX) tablet 600 mg  600 mg Oral Q12H    ondansetron (ZOFRAN) injection 4 mg  4 mg IntraVENous Q4H PRN        Lab Data Reviewed: (see below)  Lab Review:     Recent Labs     01/10/20  0558 01/09/20  1503   WBC 20.0* 26.0*   HGB 12.7 14.8   HCT 39.4 44.0    400     Recent Labs     01/10/20  0558 01/09/20  1503    135*   K 3.5 4.0    103   CO2 27 19*   * 106*   BUN 10 12   CREA 0.84 0.76   CA 8.7 8.8   ALB  --  3.0*   TBILI  --  0.9   SGOT  --  24   ALT  --  36     No results found for: GLUCPOC  No results for input(s): PH, PCO2, PO2, HCO3, FIO2 in the last 72 hours. No results for input(s): INR, INREXT in the last 72 hours.   All Micro Results     Procedure Component Value Units Date/Time    CULTURE, BLOOD, PAIRED [[id number]] Collected:  01/09/20 2111    Order Status:  Completed Specimen:  Blood Updated:  01/10/20 0619     Special Requests: NO SPECIAL REQUESTS        Culture result: NO GROWTH AFTER 8 HOURS       RESPIRATORY PANEL,PCR,NASOPHARYNGEAL [[id number]] Collected:  01/09/20 2111    Order Status:  Completed Specimen:  Nasopharyngeal Updated:  01/10/20 0009     Adenovirus NOT DETECTED        Coronavirus 229E NOT DETECTED        Coronavirus HKU1 NOT DETECTED        Coronavirus CVNL63 NOT DETECTED        Coronavirus OC43 NOT DETECTED        Metapneumovirus NOT DETECTED        Rhinovirus and Enterovirus NOT DETECTED        Influenza A NOT DETECTED        Influenza A, subtype H1 NOT DETECTED        Influenza A, subtype H3 NOT DETECTED        INFLUENZA A H1N1 PCR NOT DETECTED        Influenza B NOT DETECTED        Parainfluenza 1 NOT DETECTED        Parainfluenza 2 NOT DETECTED        Parainfluenza 3 NOT DETECTED        Parainfluenza virus 4 NOT DETECTED        RSV by PCR NOT DETECTED        B. parapertussis, PCR NOT DETECTED        Bordetella pertussis - PCR NOT DETECTED        Chlamydophila pneumoniae DNA, QL, PCR NOT DETECTED        Mycoplasma pneumoniae DNA, QL, PCR NOT DETECTED       CULTURE, URINE [[id number]] Collected:  01/09/20 1735    Order Status:  Completed Specimen:  Urine from Clean catch Updated:  01/09/20 2017    C. DIFFICILE AG & TOXIN A/B [[id number]]     Order Status:  Sent Specimen:  Stool     INFLUENZA A & B AG (RAPID TEST) [[id number]] Collected:  01/09/20 1503    Order Status:  Completed Specimen:  Nasopharyngeal from Nasal washing Updated:  01/09/20 1549     Influenza A Antigen NEGATIVE         Influenza B Antigen NEGATIVE              Other pertinent lab: none    Total time spent with patient: 39 Minutes I reviewed chart, notes, data and current medications in the medical record. I have examined and treated the patient at bedside during this period.                  Care Plan discussed with: Patient, Family, Care Manager, Nursing Staff, Consultant/Specialist and >50% of time spent in counseling and coordination of care    Discussed:  Care Plan and D/C Planning    Prophylaxis:  H2B/PPI    Disposition:  Home w/Family           ___________________________________________________    Attending Physician: Paula Skaggs MD

## 2020-01-10 NOTE — CONSULTS
[facility] [place] at [facility]  [address] [facility], [address] [facility] [address] [facility]  Filiberto Ma: [phone]  F: [phone]      Reason for Visit:   Kal Larson is a 81 y.o. female who is seen in consultation at the request of Dr. Milvia Alvarado for evaluation of metastases, new dx. Hematology / Oncology Treatment History:   n/a    History of Present Illness:   Ms. Kal Larson is a 80 y/o female who p/w fevers, abdominal pain, n/v and diarrhea, found to have possible left renal mass. Pt reports fever has been present for 2 weeks with more recent development of of vomiting and diarrhea. She has had an ongoing sinus infection x 4 months that has been treated with numerous abx; seen by ENT on 1/6. ED labs notable for WBC 26, ANC 22.1, UA with 3+ bacteria. CT abd reveals ill defined left renal hypodensity concerning for renal cell carcinoma vs pylenephritis. Patient had blood and urine cultures sent, was started on broad spectrum antibiotics. She states she is feeling better, but still has midline lower abdominal pain, loose stools, productive cough. Denies nausea or vomiting currently. Able to eat small portion of breakfast this am.  at bedside. Nonsmoker. PMHx: None  PSurgHx: Cholecystectomy  SHx: Nonsmoker, occasional EtOH. Lives with . Her adult daughter and fiance live with them as well. FHx: Negative for cancer. No past medical history on file. No past surgical history on file. Social History     Tobacco Use    Smoking status: Not on file   Substance Use Topics    Alcohol use: Not on file      No family history on file.   Current Facility-Administered Medications   Medication Dose Route Frequency    sodium chloride (NS) flush 5-40 mL  5-40 mL IntraVENous Q8H    sodium chloride (NS) flush 5-40 mL  5-40 mL IntraVENous PRN    0.9% sodium chloride infusion  75 mL/hr IntraVENous CONTINUOUS    cefTRIAXone (ROCEPHIN) 1 g in 0.9% sodium chloride (MBP/ADV) 50 mL ADV  1 g IntraVENous Q24H    acetaminophen (TYLENOL) tablet 650 mg  650 mg Oral Q4H PRN    heparin (porcine) injection 5,000 Units  5,000 Units SubCUTAneous Q8H    aspirin chewable tablet 81 mg  81 mg Oral DAILY    guaiFENesin ER (MUCINEX) tablet 600 mg  600 mg Oral Q12H    ondansetron (ZOFRAN) injection 4 mg  4 mg IntraVENous Q4H PRN     Current Outpatient Medications   Medication Sig    pseudoephedrine (SUDAFED) 30 mg tablet Take 60 mg by mouth every six (6) hours as needed for Congestion. No Known Allergies     Review of Systems: A complete review of systems was obtained, negative except as described above. Physical Exam:     Visit Vitals  /81 (BP 1 Location: Left arm, BP Patient Position: At rest)   Pulse 77   Temp 97.8 °F (36.6 °C)   Resp 25   Ht 5' 4\" (1.626 m)   Wt 102.1 kg (225 lb)   SpO2 91%   BMI 38.62 kg/m²     ECOG PS: 0  General: No distress  Eyes: PERRLA, anicteric sclerae  HENT: Atraumatic with normal appearance of ears and nose; OP clear  Neck: Supple; no thyromegaly   Lymphatic: No cervical, supraclavicular, or axillary adenopathy  Respiratory: exp rhonchi noted;, normal respiratory effort  CV: Normal rate, regular rhythm, no murmurs, no peripheral edema  GI: Soft, nondistended, no masses, no hepatomegaly, no splenomegaly. TTP in suprapubic region  MS: Digits without clubbing or cyanosis. Skin: No rashes, ecchymoses, or petechiae. Normal temperature, turgor, and texture. Neuro/Psych: Alert, oriented, appropriate affect, normal judgment/insight      Results:     Lab Results   Component Value Date/Time    WBC 20.0 (H) 01/10/2020 05:58 AM    HGB 12.7 01/10/2020 05:58 AM    HCT 39.4 01/10/2020 05:58 AM    PLATELET 306 71/99/6431 05:58 AM    MCV 91.2 01/10/2020 05:58 AM    ABS.  NEUTROPHILS 15.9 (H) 01/10/2020 05:58 AM     Lab Results   Component Value Date/Time    Sodium 137 01/10/2020 05:58 AM    Potassium 3.5 01/10/2020 05:58 AM    Chloride 104 01/10/2020 05:58 AM    CO2 27 01/10/2020 05:58 AM    Glucose 102 (H) 01/10/2020 05:58 AM    BUN 10 01/10/2020 05:58 AM    Creatinine 0.84 01/10/2020 05:58 AM    GFR est AA >60 01/10/2020 05:58 AM    GFR est non-AA >60 01/10/2020 05:58 AM    Calcium 8.7 01/10/2020 05:58 AM     Lab Results   Component Value Date/Time    Bilirubin, total 0.9 01/09/2020 03:03 PM    ALT (SGPT) 36 01/09/2020 03:03 PM    AST (SGOT) 24 01/09/2020 03:03 PM    Alk. phosphatase 134 (H) 01/09/2020 03:03 PM    Protein, total 6.7 01/09/2020 03:03 PM    Albumin 3.0 (L) 01/09/2020 03:03 PM    Globulin 3.7 01/09/2020 03:03 PM     Lab Results   Component Value Date/Time    Lipase 68 (L) 01/09/2020 03:03 PM     No results found for: INR, APTT, DDIMSQ, DDIME, 672707, Neo Gaines, FDPLT, Evelin Mu, [address], [address] [facility] [address], [address], [id number], Dominik Nahum 5334, [id number], 902735, 941992, 255506, INREXT    No results found for: CEA, 672941, C199LT, C125, JCYJ397, 2729LT, C153LT, PSA, PSALT, LDH, [id number], HCGTLT, AFP, CHRGLT    1/09/2020 CT ABD PELV W CONT  IMPRESSION:  1.  Ill-defined left renal hypodensity suspicious for renal cell carcinoma. Alternatively, this may represent pyelonephritis in the appropriate clinical  setting. Recommend clinical correlation and follow-up. MRI may provide more  information.     2.  Small lucencies in the visualized lumbar spine osseous structures. These are  nonspecific. However if the patient has a primary tumor, metastatic disease is  not excluded     3. Multiple other cystic lesions in the liver some which demonstrate internal  complexity. These may be further assessed with MRI as well. 1/09/2020 XR chest    Assessment and Recommendations:     80 yo female with no significant PMH admitted with fevers and diarrhea. 1. Left renal density: Unclear if this is a renal mass such as RCC or pyelonephritis.  Radiology recommends renal protocol CT for further evaluation, which will be done tomorrow to allow time from recent IV contrast dye load per Radiology and Urology. Urology following.   -- Will follow up CT abd/renal protocol results when available  -- Follow up made for our clinic to review results should pt be discharged over the weekend    2. Lumbar spine osseous abnormalities: Per radiology report, these are concerning for possible metastatic disease if found to have primary tumor. However, upon review with radiologist, the lesions are quite small/nonspecific in L2, L5, T12 areas. Recommend consideration of bone scan after reviewing results of renal CT protocol   -- Continue to monitor; will obtain further imaging as needed    3. Fever: Likely 2/2 to UTI vs pyelonephritis. Resp panel and flu negative. -- f/u blood and urine culture results. -- On Ceftriaxone. 4. Elevated troponin: 1/10 ECHO pending. Management per primary team    5. Diarrhea: C-diff and stool culture pending. Patient seen in conjunction with Elizabeth Martínez NP.     Signed By: Darrin Monreal MD

## 2020-01-10 NOTE — PROGRESS NOTES
TRANSFER - IN REPORT:    Verbal report received from Abbie Barcenas RN(name) on [employer]  being received from ED(unit) for routine progression of care      Report consisted of patients Situation, Background, Assessment and   Recommendations(SBAR). Information from the following report(s) SBAR, Kardex and Recent Results was reviewed with the receiving nurse. Opportunity for questions and clarification was provided. Assessment completed upon patients arrival to unit and care assumed. .. Introduced self as primary RN. Initial assessment completed. VSS. Pt denies additional complaints at this time. Plan for the day discussed with pt and she verbalized understanding. Bed locked and in low position with call bell within reach. Instructed pt to press call abbasi when needed. White board updated with this RN's name. .. TRANSFER - OUT REPORT:    Verbal report given to [place]  RN(name) on [employer]  being transferred to [facility] RN(unit) for routine progression of care       Report consisted of patients Situation, Background, Assessment and   Recommendations(SBAR). Information from the following report(s) SBAR, Kardex and Recent Results was reviewed with the receiving nurse.     Lines:   Peripheral IV 01/09/20 Left Forearm (Active)   Site Assessment Clean, dry, & intact 1/10/2020  9:09 AM   Phlebitis Assessment 0 1/10/2020  9:09 AM   Infiltration Assessment 0 1/10/2020  9:09 AM   Dressing Status Occlusive 1/10/2020  9:09 AM   Dressing Type Transparent 1/10/2020  9:09 AM   Hub Color/Line Status Pink;Capped 1/10/2020  9:09 AM   Action Taken Open ports on tubing capped 1/10/2020  9:09 AM   Alcohol Cap Used Yes 1/10/2020  9:09 AM       Peripheral IV 01/09/20 Right Antecubital (Active)   Site Assessment Clean, dry, & intact 1/10/2020  9:09 AM   Phlebitis Assessment 0 1/10/2020  9:09 AM   Infiltration Assessment 0 1/10/2020  9:09 AM   Dressing Status Occlusive 1/10/2020  9:09 AM   Dressing Type Transparent 1/10/2020  9:09 AM   Hub Color/Line Status Pink;Capped 1/10/2020  9:09 AM   Action Taken Open ports on tubing capped 1/10/2020  9:09 AM   Alcohol Cap Used Yes 1/10/2020  9:09 AM        Opportunity for questions and clarification was provided.       Patient transported with:LISSA

## 2020-01-10 NOTE — H&P
[facility] [address]  HISTORY AND PHYSICAL    Name:  Jennifer Tello  MR#:  [id number]  :  1950  ACCOUNT #:  [de-identified]  ADMIT DATE:  2020      CHIEF COMPLAINT:  Fever. HISTORY OF PRESENT ILLNESS:  The patient is a 79-year-old female with no apparent past medical history, who presents to the hospital with the above-mentioned symptoms. The patient reports that she does not have a PCP and has not seen a PCP for a while. Reports that about two weeks ago she started experiencing some off and on fevers. The patient reports that a few days ago she started having increased shortness of breath associated with some dizziness, chills, productive cough, and had four episodes of diarrhea today. The patient reports that she also had loss of appetite, had continued to vomit, got concerned and decided to come to the hospital.  The patient reports that she has been battling a sinus infection for the last four months. Reports that she has had multiple antibiotics for the same and was seen by ENT on Monday, about four days back. The patient came to the ER and was found to have elevated white count with fever and was requested to be admitted under the hospitalist service. The patient reports that she has had some flank pain on the left side but denies any other complaints or problems. The patient denies any headaches, blurry vision, sore throat, trouble swallowing, trouble with speech. Denies any focal or generalized neurological weakness, recent travels, sick contact, falls, injuries, hematemesis, melena, hematuria or any other concerns or problems. PAST MEDICAL HISTORY:  See above. ALLERGIES:  NO KNOWN DRUG ALLERGIES. HOME MEDICATIONS:  None. SOCIAL HISTORY:  Denies tobacco abuse, alcohol use, IV drug abuse. Lives at home. FAMILY HISTORY:  Family history was discussed. The patient denies an family history of coronary artery disease.     REVIEW OF SYSTEMS:  All systems were reviewed and found to be essentially negative except for the symptoms mentioned above. PHYSICAL EXAMINATION:  GENERAL:  Alert x3, awake, mildly distressed, pleasant female, appears to be her stated age. VITAL SIGNS:  Temperature 98.2, pulse 105, respiratory rate 18, blood pressure 142/82, pulse ox of 97% on room air. HEENT:  Pupils equal and reactive to light. Dry mucous membranes. Tympanic membranes are clear. There is some tenderness over both the facial as well as the maxillary sinuses. NECK:  Supple. No JVD. No lymphadenopathy. CHEST:  Decreased basilar breath sounds. Scattered wheezes. CORONARY:  S1, S2 heard. ABDOMEN:  Soft, tender to palpation in the left flank. No rebound. No guarding. Bowel sounds were hypoactive. EXTREMITIES:  No clubbing, no cyanosis, no edema. NEURO/PSYCH:  Pleasant mood and affect. Cranial nerves II through XII grossly intact. No focal neurological deficits. SKIN:  Warm. LABORATORY DATA:  White count 26.8, hemoglobin 14.8, hematocrit 44.0, platelets 349. Urine is positive for nitrites, 0-100 WBC, 5200 RBC, 3+ bacteria. Sodium 135, potassium 4, chloride 103, bicarb 19, anion gap 13, glucose 106, BUN 12, creatinine 2.76, calcium 8.8, bili total 0.9. ALT 36, AST 24, alkaline phosphatase 134, lipase 68, troponin 0.07. Influenza A and B are negative. CT of the abdomen shows ill-defined left renal hypodensity suspicious for renal cell carcinoma. Ultimately, this may represent pyelonephritis in the appropriate clinical setting. Recommend clinical correlation, MRI may provide more information. Small lucencies in the visualized lumbar spine osseous structure, these are nonspecific; however, the patient has primary tumor, metastatic disease not excluded. Multiple other cystic lesions in the liver demonstrating interval complexity. MRI is recommended. X-ray of the chest shows no acute abnormality.   EKG shows normal sinus rhythm, nonspecific ST-T changes. ASSESSMENT AND PLAN:  1. Fever with systemic inflammatory response syndrome likely secondary to urinary tract infection versus pyelonephritis. The patient will be admitted on a telemetry bed. We will start the patient on broad-spectrum IV antibiotics, IV fluids, blood cultures, urine culture, antipyretic, supportive care, and close monitoring. Further intervention per hospital course. Reassess as needed. Monitor for now. Antiemetics. 2.  Renal lesions, suspicious for renal mass. Urology consult, supportive care, pain control. May consider surveillance CT scans in the morning. Continue to closely monitor, await urology input, and reassess as needed. The patient may need biopsy of the lesion. Monitor for now. 3.  Urinary tract infection. Please see above. IV antibiotics, urine culture, supportive care, IV hydration and close monitoring, antipyretics, and await culture results. 4.  Metabolic acidosis, likely secondary to #1. Provide IV hydration. Repeat labs in the morning. Continue to monitor. Further intervention per hospital course. Reassess as needed. 5.  Troponemia. Likely the band related. We will rule out acute coronary syndrome. Aspirin, lipid panel, echocardiogram, telemetry monitoring, trend troponins, and further intervention per hospital course. Reassess as needed. Continue to monitor. 6.  Leukocytosis secondary to #1. Monitor and trend. Further intervention per hospital course. Reassess as needed. 7.  Nausea, vomiting, and diarrhea. Antiemetics. The patient has been on multiple antibiotics recently. We will test stool for Clostridium difficile and send stool culture and we will continue to closely monitor. Further intervention per hospital course. Reassess as needed. Supportive care for now. 8.  Gastrointestinal and deep venous thrombosis prophylaxis. The patient will remain on heparin.         Danielito Bliss MD      [id number]  D:  01/09/2020 18:45  T:  01/09/2020 22:07  JOB #:  [id number]

## 2020-01-10 NOTE — ED NOTES
Bedside and Verbal shift change report given to Edna Phillips (oncoming nurse) by Adri Parham (offgoing nurse). Report included the following information SBAR, Kardex, ED Summary, MAR, Recent Results and Cardiac Rhythm NSR.

## 2020-01-10 NOTE — CONSULTS
Urology Consult    Patient: Jadon Sorensen MRN: [id number]  SSN: [id number]    YOB: 1950  Age: 81 y.o. Sex: female          Date of Consultation:  January 10, 2020  Requesting Physician: Luanna Holter, MD  Reason for Consultation:  Renal Mass         History of Present Illness:  Jadon Sorensen is a 81 y.o. female admitted 2020 to the hospital for Nausea & vomiting [R11.2]. She presented to the ED with complaints of fevers for about 2 weeks and multiple sinus infections and antibiotics for about 4 months. She had a CT scan that showed multiple renal cysts as well as left renal lesion suspicious for renal cell carcinoma. Radiology has recommended further evaluation with renal mass MRI. She does report an episode of left flank pain on 2020. WBC in ED was 26.0, creatinine 0.84. UA consistent with infection. Images reviewed with Dr. Johny Rangel. She does have an area of concern in her left kidney. Given clinical presentation, more likely consistent with pyelonephritis, although cannot rule out renal cell carcinoma. Discussed with patient and oncology. She will need repeat imaging, but need to determine best timing. No past medical history on file. No past surgical history on file. No Known Allergies     Prior to Admission medications    Medication Sig Start Date End Date Taking? Authorizing Provider   pseudoephedrine (SUDAFED) 30 mg tablet Take 60 mg by mouth every six (6) hours as needed for Congestion. Yes Provider, Historical       Social History     Tobacco Use    Smoking status: Not on file   Substance Use Topics    Alcohol use: Not on file        No family history on file. ROS:  Admission ROS from 2020 was reviewed and changes (other than per HPI) include: none.     Physical Exam:    Vital Signs:  Temp (24hrs), Av.2 °F (36.8 °C), Min:97.8 °F (36.6 °C), Max:98.5 °F (36.9 °C)   Blood pressure 140/81, pulse 82, temperature 98.5 °F (36.9 °C), resp. rate 21, height 5' 4\" (1.626 m), weight 102.1 kg (225 lb 1.4 oz), SpO2 98 %. I&O's:  No intake/output data recorded. Appearance: well-developed NAD   Conjunctiva/Lids: conjunctivae and lids normal   External Ears/Nose: normal no lesions or deformities   Neck: no tracheal deviation  Respiratory Effort: breathing easily   Lower Extremity: no edema   Gait/Station: not observed  Skin Inspection: warm and dry   Mood/Affect: normal      Lab Review:     CBC   Recent Labs     01/10/20  0558 01/09/20  1503   WBC 20.0* 26.0*   HGB 12.7 14.8   HCT 39.4 44.0    400     CMP   Recent Labs     01/10/20  0558 01/09/20  1503    135*   K 3.5 4.0    103   CO2 27 19*   * 106*   BUN 10 12   CREA 0.84 0.76   CA 8.7 8.8   ALB  --  3.0*   TBILI  --  0.9   SGOT  --  24   ALT  --  36       Other No results for input(s): INR, PSA, INREXT in the last 72 hours.     No lab exists for component: PTT    UA:   Lab Results   Component Value Date/Time    Color YELLOW/STRAW 01/09/2020 05:33 PM    Appearance CLEAR 01/09/2020 05:33 PM    Specific gravity 1.005 01/09/2020 05:33 PM    pH (UA) 5.5 01/09/2020 05:33 PM    Protein 30 (A) 01/09/2020 05:33 PM    Glucose NEGATIVE  01/09/2020 05:33 PM    Ketone 40 (A) 01/09/2020 05:33 PM    Bilirubin NEGATIVE  01/09/2020 05:33 PM    Urobilinogen 0.2 01/09/2020 05:33 PM    Nitrites POSITIVE (A) 01/09/2020 05:33 PM    Leukocyte Esterase MODERATE (A) 01/09/2020 05:33 PM    Epithelial cells FEW 01/09/2020 05:33 PM    Bacteria 3+ (A) 01/09/2020 05:33 PM    WBC >100 (H) 01/09/2020 05:33 PM    RBC  01/09/2020 05:33 PM       Cultures  Results     Procedure Component Value Units Date/Time    CULTURE, BLOOD, PAIRED [[id number]] Collected:  01/09/20 2111    Order Status:  Completed Specimen:  Blood Updated:  01/10/20 0619     Special Requests: NO SPECIAL REQUESTS        Culture result: NO GROWTH AFTER 8 HOURS       RESPIRATORY PANEL,PCR,NASOPHARYNGEAL [[id number]] Collected: 01/09/20 2111    Order Status:  Completed Specimen:  Nasopharyngeal Updated:  01/10/20 0009     Adenovirus NOT DETECTED        Coronavirus 229E NOT DETECTED        Coronavirus HKU1 NOT DETECTED        Coronavirus CVNL63 NOT DETECTED        Coronavirus OC43 NOT DETECTED        Metapneumovirus NOT DETECTED        Rhinovirus and Enterovirus NOT DETECTED        Influenza A NOT DETECTED        Influenza A, subtype H1 NOT DETECTED        Influenza A, subtype H3 NOT DETECTED        INFLUENZA A H1N1 PCR NOT DETECTED        Influenza B NOT DETECTED        Parainfluenza 1 NOT DETECTED        Parainfluenza 2 NOT DETECTED        Parainfluenza 3 NOT DETECTED        Parainfluenza virus 4 NOT DETECTED        RSV by PCR NOT DETECTED        B. parapertussis, PCR NOT DETECTED        Bordetella pertussis - PCR NOT DETECTED        Chlamydophila pneumoniae DNA, QL, PCR NOT DETECTED        Mycoplasma pneumoniae DNA, QL, PCR NOT DETECTED       C. DIFFICILE AG & TOXIN A/B [[id number]]     Order Status:  Sent Specimen:  Stool     CULTURE, URINE [[id number]] Collected:  01/09/20 1735    Order Status:  Completed Specimen:  Urine from Clean catch Updated:  01/09/20 2017    INFLUENZA A & B AG (RAPID TEST) [[id number]] Collected:  01/09/20 1503    Order Status:  Completed Specimen:  Nasopharyngeal from Nasal washing Updated:  01/09/20 1549     Influenza A Antigen NEGATIVE         Influenza B Antigen NEGATIVE             Imaging:      Assessment:     Principal Problem:    Pyelonephritis (1/10/2020)    Active Problems:    Nausea vomiting and diarrhea (1/9/2020)      Fever (1/10/2020)      UTI (urinary tract infection) (1/10/2020)      Bladder mass (1/10/2020)      Bone metastases (HCC) (1/10/2020)      Liver metastases (HCC) (1/10/2020)      Leukocytosis (1/10/2020)      Type 2 acute myocardial infarction ([facility] [address] (1/10/2020)      Obese (1/10/2020)        Plan:     1. Continue culture directed antibiotics.   2. Would recommend waiting until tomorrow for repeat imaging. Patient seen and examined with Dr. Luis Felipe May.     Signed By: Kenton Lee NP  - January 10, 2020

## 2020-01-10 NOTE — PROGRESS NOTES
1/10/2020  3:18 PM  Chart reviewed. Noted that there are no orders for PT/OT at this time. Provided the patient with the list of PCPs as was indicated in a previous CM note. She seemed in high spirits. CM will continue to follow and assess discharge needs. Plan:  1. Home with family assistance  2. PCP follow up   3.  CM will continue to follow    CLIVE Ramirez MSW

## 2020-01-11 ENCOUNTER — APPOINTMENT (OUTPATIENT)
Dept: CT IMAGING | Age: 70
DRG: 871 | End: 2020-01-11
Attending: INTERNAL MEDICINE
Payer: COMMERCIAL

## 2020-01-11 VITALS
HEART RATE: 71 BPM | DIASTOLIC BLOOD PRESSURE: 84 MMHG | WEIGHT: 225.09 LBS | SYSTOLIC BLOOD PRESSURE: 126 MMHG | RESPIRATION RATE: 16 BRPM | TEMPERATURE: 98.3 F | HEIGHT: 64 IN | OXYGEN SATURATION: 95 % | BODY MASS INDEX: 38.43 KG/M2

## 2020-01-11 LAB
ANION GAP SERPL CALC-SCNC: 4 MMOL/L (ref 5–15)
BACTERIA SPEC CULT: ABNORMAL
BUN SERPL-MCNC: 9 MG/DL (ref 6–20)
BUN/CREAT SERPL: 13 (ref 12–20)
CALCIUM SERPL-MCNC: 8.4 MG/DL (ref 8.5–10.1)
CC UR VC: ABNORMAL
CHLORIDE SERPL-SCNC: 108 MMOL/L (ref 97–108)
CO2 SERPL-SCNC: 27 MMOL/L (ref 21–32)
CREAT SERPL-MCNC: 0.72 MG/DL (ref 0.55–1.02)
GLUCOSE SERPL-MCNC: 144 MG/DL (ref 65–100)
POTASSIUM SERPL-SCNC: 3 MMOL/L (ref 3.5–5.1)
SERVICE CMNT-IMP: ABNORMAL
SODIUM SERPL-SCNC: 139 MMOL/L (ref 136–145)

## 2020-01-11 PROCEDURE — 74011250637 HC RX REV CODE- 250/637: Performed by: INTERNAL MEDICINE

## 2020-01-11 PROCEDURE — 80048 BASIC METABOLIC PNL TOTAL CA: CPT

## 2020-01-11 PROCEDURE — 74011250636 HC RX REV CODE- 250/636: Performed by: INTERNAL MEDICINE

## 2020-01-11 PROCEDURE — 74170 CT ABD WO CNTRST FLWD CNTRST: CPT

## 2020-01-11 PROCEDURE — 74011250637 HC RX REV CODE- 250/637

## 2020-01-11 PROCEDURE — 74011636320 HC RX REV CODE- 636/320: Performed by: RADIOLOGY

## 2020-01-11 PROCEDURE — 94760 N-INVAS EAR/PLS OXIMETRY 1: CPT

## 2020-01-11 PROCEDURE — 36415 COLL VENOUS BLD VENIPUNCTURE: CPT

## 2020-01-11 RX ORDER — POTASSIUM CHLORIDE 750 MG/1
40 TABLET, FILM COATED, EXTENDED RELEASE ORAL
Status: COMPLETED | OUTPATIENT
Start: 2020-01-11 | End: 2020-01-11

## 2020-01-11 RX ORDER — CIPROFLOXACIN 500 MG/1
500 TABLET ORAL EVERY 12 HOURS
Qty: 14 TAB | Refills: 0 | Status: SHIPPED | OUTPATIENT
Start: 2020-01-12 | End: 2020-01-19

## 2020-01-11 RX ORDER — ATORVASTATIN CALCIUM 20 MG/1
20 TABLET, FILM COATED ORAL DAILY
Qty: 30 TAB | Refills: 0 | Status: SHIPPED | OUTPATIENT
Start: 2020-01-11 | End: 2020-02-10

## 2020-01-11 RX ORDER — ATORVASTATIN CALCIUM 20 MG/1
TABLET, FILM COATED ORAL
Status: COMPLETED
Start: 2020-01-11 | End: 2020-01-11

## 2020-01-11 RX ORDER — POTASSIUM CHLORIDE 750 MG/1
TABLET, FILM COATED, EXTENDED RELEASE ORAL
Status: DISCONTINUED
Start: 2020-01-11 | End: 2020-01-11 | Stop reason: HOSPADM

## 2020-01-11 RX ORDER — ATORVASTATIN CALCIUM 20 MG/1
20 TABLET, FILM COATED ORAL DAILY
Status: DISCONTINUED | OUTPATIENT
Start: 2020-01-11 | End: 2020-01-11 | Stop reason: HOSPADM

## 2020-01-11 RX ORDER — CIPROFLOXACIN 500 MG/1
500 TABLET ORAL EVERY 12 HOURS
Status: DISCONTINUED | OUTPATIENT
Start: 2020-01-12 | End: 2020-01-11 | Stop reason: HOSPADM

## 2020-01-11 RX ADMIN — POTASSIUM CHLORIDE 40 MEQ: 750 TABLET, EXTENDED RELEASE ORAL at 14:19

## 2020-01-11 RX ADMIN — ATORVASTATIN CALCIUM 20 MG: 20 TABLET, FILM COATED ORAL at 09:35

## 2020-01-11 RX ADMIN — HEPARIN SODIUM 5000 UNITS: 5000 INJECTION INTRAVENOUS; SUBCUTANEOUS at 00:48

## 2020-01-11 RX ADMIN — IOPAMIDOL 100 ML: 612 INJECTION, SOLUTION INTRAVENOUS at 12:05

## 2020-01-11 RX ADMIN — HEPARIN SODIUM 5000 UNITS: 5000 INJECTION INTRAVENOUS; SUBCUTANEOUS at 09:35

## 2020-01-11 RX ADMIN — GUAIFENESIN 600 MG: 600 TABLET, EXTENDED RELEASE ORAL at 09:36

## 2020-01-11 RX ADMIN — ASPIRIN 81 MG 81 MG: 81 TABLET ORAL at 09:35

## 2020-01-11 RX ADMIN — Medication 10 ML: at 05:58

## 2020-01-11 RX ADMIN — ATORVASTATIN CALCIUM 20 MG: 20 TABLET, FILM COATED ORAL at 10:00

## 2020-01-11 NOTE — DISCHARGE SUMMARY
Physician Discharge Summary     Patient ID:  July Taveras  [id number]  81 y.o.  1950    Admit date: 1/9/2020    Discharge date and time: 1/11/2020    Admission Diagnoses: Nausea & vomiting [R11.2]    Discharge Diagnoses:    Principal Diagnosis   Pyelonephritis                                             Hospital Course and other diagnoses  Pyelonephritis / UTI (urinary tract infection) - POA, unclear if related to a cancer. GNR on urine cx and NGTD on blood cx. Monitor. Ceftriaxone for now, DC on ciprofloxacin for bioavailability and penetration of tissue     Renal mass / Bone metastases / Liver metastases - Noted abnormal findings on non contrast CT scan. Grave concern of stage 4 kidney cancer. Oncology consult. They can plan further Bx and imaging as needed. Repeat renal CT today. Home if imaging is unremarakble     Type 2 acute myocardial infarction - Mildly elevated troponin is likely strain due to uti and sepsis. Monitor. ECHO was unremarkable. Would defer any further workup pending urology on onc plans.     Hyperlipidemia -  and chol 190 noted on labs. Start atorvastatin. PCP to follow.     Nausea vomiting and diarrhea - POA due to UTI and or cancer. Symptoms resolved with supportive care. IVF. Zofran. Diet as tolerated     Obese - Advise weight loss    Sepsis / Fever / Leukocytosis - POA, presumed due to UTI. DDx paraneoplastic. NOT severe sepsis. SIRS resolved on IVF, Abx, monitor cx.     PCP: None    Consults: Hematology/Oncology and Urology    Significant Diagnostic Studies: See Hospital Course    Discharged home in improved condition.     Discharge Exam:  /83 (BP 1 Location: Left arm, BP Patient Position: At rest)   Pulse 69   Temp 98.1 °F (36.7 °C)   Resp 16   Ht 5' 4\" (1.626 m)   Wt 102.1 kg (225 lb 1.4 oz)   SpO2 94%   BMI 38.64 kg/m²      Gen:  Obese, in no acute distress  HEENT:  Pink conjunctivae, PERRL, hearing intact to voice, moist mucous membranes  Neck: Supple, without masses, thyroid non-tender  Resp:  No accessory muscle use, clear breath sounds without wheezes rales or rhonchi  Card:  No murmurs, normal S1, S2 without thrills, bruits or peripheral edema  Abd:  Soft, non-tender, non-distended, normoactive bowel sounds are present, no mass  Lymph:  No cervical or inguinal adenopathy  Musc:  No cyanosis or clubbing  Skin:  No rashes or ulcers, skin turgor is good  Neuro:  Cranial nerves are grossly intact, no focal motor weakness, follows commands appropriately  Psych:  Good insight, oriented to person, place and time, alert    Patient Instructions:   Current Discharge Medication List      START taking these medications    Details   atorvastatin (LIPITOR) 20 mg tablet Take 1 Tab by mouth daily for 30 days. Qty: 30 Tab, Refills: 0      ciprofloxacin HCl (CIPRO) 500 mg tablet Take 1 Tab by mouth every twelve (12) hours for 7 days. Qty: 14 Tab, Refills: 0         CONTINUE these medications which have NOT CHANGED    Details   pseudoephedrine (SUDAFED) 30 mg tablet Take 60 mg by mouth every six (6) hours as needed for Congestion. Activity: Activity as tolerated  Diet: Low fat, Low cholesterol  Wound Care: None needed    Follow-up with any new PCP in a few weeks and Dr chua in 1 week.   Follow-up tests/labs - none    Signed:  Annel Diaz MD  1/11/2020  8:35 AM

## 2020-01-11 NOTE — DISCHARGE INSTRUCTIONS
Patient Discharge Instructions    Mónica Abdul / [id number] : 1950    Admitted 2020 Discharged: 2020     Primary Diagnoses  Problem List as of 2020 Date Reviewed: 1/10/2020           Fever   UTI (urinary tract infection)   Bladder mass   Bone metastases ([facility]   Liver metastases ([facility]   * (Principal) Pyelonephritis   Leukocytosis   Type 2 acute myocardial infarction ([facility]   Obese   Sepsis ([facility]   Nausea vomiting and diarrhea          Take Home Medications     · It is important that you take the medication exactly as they are prescribed. · Keep your medication in the bottles provided by the pharmacist and keep a list of the medication names, dosages, and times to be taken in your wallet. · Do not take other medications without consulting your doctor. What to do at Home    Recommended diet: Low fat, Low cholesterol    Recommended activity: Activity as tolerated    If you experience worse symptoms, please follow up with any PCP or oncology. Follow-up with any new PCP in a few weeks, and Dr Woody Gutierrez next Friday        Information obtained by :  I understand that if any problems occur once I am at home I am to contact my physician. I understand and acknowledge receipt of the instructions indicated above.                                                                                                                                            Physician's or R.N.'s Signature                                                                  Date/Time                                                                                                                                              Patient or Representative Signature                                                          Date/Time

## 2020-01-11 NOTE — PROGRESS NOTES
[facility]    Medical Progress Note      NAME: Devorah Diaz   :  1950  MRM:  [id number]    Date/Time: 2020  11:57 AM          Assessment and Plan:     Pyelonephritis / UTI (urinary tract infection) - POA, unclear if related to a cancer. GNR on urine cx and NGTD on blood cx. Monitor. Ceftriaxone for now, DC on ciprofloxacin for bioavailability and penetration of tissue    Renal mass / Bone metastases / Liver metastases - Noted abnormal findings on non contrast CT scan. Grave concern of stage 4 kidney cancer. Oncology consult. They can plan further Bx and imaging as needed. Repeat renal CT today. Home if imaging is unremarakble    Type 2 acute myocardial infarction - Mildly elevated troponin is likely strain due to uti and sepsis. Monitor. ECHO was unremarkable. Would defer any further workup pending urology on onc plans. Hyperlipidemia -  and chol 190 noted on labs. Start atorvastatin. PCP to follow. Nausea vomiting and diarrhea - POA due to UTI and or cancer. Symptoms resolved with supportive care. IVF. Zofran. Diet as tolerated    Obese - Advise weight loss    Sepsis / Fever / Leukocytosis - POA, presumed due to UTI. DDx paraneoplastic. NOT severe sepsis. SIRS resolved on IVF, Abx, monitor cx. Subjective:     Chief Complaint:  Abd pain gone, wants to go home    ROS:  (bold if positive, if negative)    Tolerating some PT  Tolerating Diet        Objective:     Last 24hrs VS reviewed since prior progress note.  Most recent are:    Visit Vitals  /83 (BP 1 Location: Left arm, BP Patient Position: At rest)   Pulse 69   Temp 98.1 °F (36.7 °C)   Resp 16   Ht 5' 4\" (1.626 m)   Wt 102.1 kg (225 lb 1.4 oz)   SpO2 94%   BMI 38.64 kg/m²     SpO2 Readings from Last 6 Encounters:   20 94%            Intake/Output Summary (Last 24 hours) at 2020 0829  Last data filed at 2020 0746  Gross per 24 hour   Intake 2202.5 ml   Output 400 ml   Net 1802.5 ml        Physical Exam:    Gen:  Obese, in no acute distress  HEENT:  Pink conjunctivae, PERRL, hearing intact to voice, moist mucous membranes  Neck:  Supple, without masses, thyroid non-tender  Resp:  No accessory muscle use, clear breath sounds without wheezes rales or rhonchi  Card:  No murmurs, normal S1, S2 without thrills, bruits or peripheral edema  Abd:  Soft, non-tender, non-distended, normoactive bowel sounds are present, no mass  Lymph:  No cervical or inguinal adenopathy  Musc:  No cyanosis or clubbing  Skin:  No rashes or ulcers, skin turgor is good  Neuro:  Cranial nerves are grossly intact, no focal motor weakness, follows commands appropriately  Psych:  Good insight, oriented to person, place and time, alert    Telemetry reviewed:   normal sinus rhythm  __________________________________________________________________  Medications Reviewed: (see below)  Medications:     Current Facility-Administered Medications   Medication Dose Route Frequency    atorvastatin (LIPITOR) tablet 20 mg  20 mg Oral DAILY    [START ON 1/12/2020] ciprofloxacin HCl (CIPRO) tablet 500 mg  500 mg Oral Q12H    sodium chloride (NS) flush 5-40 mL  5-40 mL IntraVENous Q8H    sodium chloride (NS) flush 5-40 mL  5-40 mL IntraVENous PRN    0.9% sodium chloride infusion  75 mL/hr IntraVENous CONTINUOUS    cefTRIAXone (ROCEPHIN) 1 g in 0.9% sodium chloride (MBP/ADV) 50 mL ADV  1 g IntraVENous Q24H    acetaminophen (TYLENOL) tablet 650 mg  650 mg Oral Q4H PRN    heparin (porcine) injection 5,000 Units  5,000 Units SubCUTAneous Q8H    aspirin chewable tablet 81 mg  81 mg Oral DAILY    guaiFENesin ER (MUCINEX) tablet 600 mg  600 mg Oral Q12H    ondansetron (ZOFRAN) injection 4 mg  4 mg IntraVENous Q4H PRN        Lab Data Reviewed: (see below)  Lab Review:     Recent Labs     01/10/20  0558 01/09/20  1503   WBC 20.0* 26.0*   HGB 12.7 14.8   HCT 39.4 44.0    400     Recent Labs     01/10/20  0558 01/09/20  1503   NA 137 135*   K 3.5 4.0    103   CO2 27 19*   * 106*   BUN 10 12   CREA 0.84 0.76   CA 8.7 8.8   ALB  --  3.0*   TBILI  --  0.9   SGOT  --  24   ALT  --  36     No results found for: GLUCPOC  No results for input(s): PH, PCO2, PO2, HCO3, FIO2 in the last 72 hours. No results for input(s): INR, INREXT, INREXT in the last 72 hours.   All Micro Results     Procedure Component Value Units Date/Time    CULTURE, BLOOD, PAIRED [[id number]] Collected:  01/09/20 2111    Order Status:  Completed Specimen:  Blood Updated:  01/11/20 0553     Special Requests: NO SPECIAL REQUESTS        Culture result: NO GROWTH 2 DAYS       CULTURE, URINE [[id number]]  (Abnormal) Collected:  01/09/20 1735    Order Status:  Completed Specimen:  Urine from Clean catch Updated:  01/10/20 1307     Special Requests: NO SPECIAL REQUESTS        [place] Count --        >100,000  COLONIES/mL       Culture result: GRAM NEGATIVE RODS       RESPIRATORY Darin Puckett [[id number]] Collected:  01/09/20 2111    Order Status:  Completed Specimen:  Nasopharyngeal Updated:  01/10/20 0009     Adenovirus NOT DETECTED        Coronavirus 229E NOT DETECTED        Coronavirus HKU1 NOT DETECTED        Coronavirus CVNL63 NOT DETECTED        Coronavirus OC43 NOT DETECTED        Metapneumovirus NOT DETECTED        Rhinovirus and Enterovirus NOT DETECTED        Influenza A NOT DETECTED        Influenza A, subtype H1 NOT DETECTED        Influenza A, subtype H3 NOT DETECTED        INFLUENZA A H1N1 PCR NOT DETECTED        Influenza B NOT DETECTED        Parainfluenza 1 NOT DETECTED        Parainfluenza 2 NOT DETECTED        Parainfluenza 3 NOT DETECTED        Parainfluenza virus 4 NOT DETECTED        RSV by PCR NOT DETECTED        B. parapertussis, PCR NOT DETECTED        Bordetella pertussis - PCR NOT DETECTED        Chlamydophila pneumoniae DNA, QL, PCR NOT DETECTED        Mycoplasma pneumoniae DNA, QL, PCR NOT DETECTED       C. DIFFICILE AG & TOXIN A/B [[id number]]     Order Status:  Sent Specimen:  Stool     INFLUENZA A & B AG (RAPID TEST) [[id number]] Collected:  01/09/20 1503    Order Status:  Completed Specimen:  Nasopharyngeal from Nasal washing Updated:  01/09/20 1549     Influenza A Antigen NEGATIVE         Influenza B Antigen NEGATIVE              Other pertinent lab: none    Total time spent with patient: 39 Minutes I reviewed chart, notes, data and current medications in the medical record. I have examined and treated the patient at bedside during this period.                  Care Plan discussed with: Patient, Nursing Staff, Consultant/Specialist and >50% of time spent in counseling and coordination of care    Discussed:  Care Plan and D/C Planning    Prophylaxis:  H2B/PPI    Disposition:  Home w/Family           ___________________________________________________    Attending Physician: Nadine Cameron MD

## 2020-01-11 NOTE — PROGRESS NOTES
Bedside and Verbal shift change report given to Maritza Myers RN (oncoming nurse) by Markus Coe RN (offgoing nurse). Report included the following information SBAR, Kardex and ED Summary.

## 2020-01-11 NOTE — PROGRESS NOTES
Urology Progress Note    Patient: William Francis MRN: [id number]  SSN: [id number]    YOB: 1950  Age: 81 y.o. Sex: female        ADMITTED: 2020 to Nicole Packer MD for Nausea & vomiting [R11.2]  POD# * No surgery found *     She feels well. No complaints. Hoping to go home after CT scan. Vitals: Temp (24hrs), Av.2 °F (36.8 °C), Min:97.9 °F (36.6 °C), Max:98.6 °F (37 °C)                   Blood pressure 129/83, pulse 69, temperature 98.1 °F (36.7 °C), resp. rate 16, height 5' 4\" (1.626 m), weight 102.1 kg (225 lb 1.4 oz), SpO2 94 %. Intake and Output:   1901 -  0700  In: 2202.5 [P.O.:360; I.V.:1842.5]  Out: 400 [Urine:400]            Labs:  Labs:   Lab Results   Component Value Date/Time    WBC 20.0 (H) 01/10/2020 05:58 AM    HGB 12.7 01/10/2020 05:58 AM    Creatinine 0.72 2020 08:10 AM         Assessment/Plan:        Discharge with Cipro after CT scan. Follow-up with Dr. Soco Barnett as outpatient.             Signed By: Cheikh Mock MD - 2020

## 2020-01-11 NOTE — PROGRESS NOTES
Bedside and Verbal shift change report given to MICHAEL Howard (oncoming nurse) by Dimitris Duarte (offgoing nurse). Report included the following information SBAR and Kardex.

## 2020-01-11 NOTE — PROGRESS NOTES
6536  Pyxis downtime: Lipitor 20mg given ONCE - override pull in pyxis. 0945  CT scan pick-up between 11-1130am. Patient to be NPO until then. Patient is aware. 1440  Given discharge instructions and prescriptions. IV access taken out. Both RN and patient signed AVS. Patient ready for discharge.

## 2020-01-13 NOTE — CARDIO/PULMONARY
Cardiac Rehab: Chart reviewed due to elevated troponin. Per Dr Rosario Mccormick, dx included: type 2 MI, likely due to strain in setting of UTI & sepsis. Pt is not eligible for outpatient cardiac rehab with type 2 MI, due to lack of documented CAD.

## 2020-01-13 NOTE — PROGRESS NOTES
[facility] [place] [facility]  [address], [address] [facility] [address]  W: [phone]  F: [phone]      Reason for Visit:   William Francis is a 81 y.o. female who comes in for f/u of left renal mass. Hematology / Oncology Treatment History:   n/a    History of Present Illness:   Ms. William Francis is a 82 y/o female who p/w fevers, abdominal pain, n/v and diarrhea, found to have possible left renal mass. Pt reports fever has been present for 2 weeks with more recent development of of vomiting and diarrhea. She has had an ongoing sinus infection x 4 months that has been treated with numerous abx; seen by ENT on 1/6. ED labs notable for WBC 26, ANC 22.1, UA with 3+ bacteria. CT abd reveals ill defined left renal hypodensity concerning for renal cell carcinoma vs pylenephritis. Patient had blood and urine cultures sent, was started on broad spectrum antibiotics. She states she is feeling better, but still has midline lower abdominal pain, loose stools, productive cough. Denies nausea or vomiting currently. Able to eat small portion of breakfast this am.  at bedside. Nonsmoker. Interval History: Patient was discharged on Cipro, which will be completed tomorrow. She states the suprapubic pain has resolved. No more fevers. No hematuria. She states she does not have any Urology f/u appt scheduled at this time. PMHx: None  PSurgHx: Cholecystectomy  SHx: Nonsmoker, occasional EtOH. Lives with . Her adult daughter and fiance live with them as well. FHx: Negative for cancer. No past medical history on file. No past surgical history on file. Social History     Tobacco Use    Smoking status: Not on file   Substance Use Topics    Alcohol use: Not on file      No family history on file. Current Outpatient Medications   Medication Sig    atorvastatin (LIPITOR) 20 mg tablet Take 1 Tab by mouth daily for 30 days.     ciprofloxacin HCl (CIPRO) 500 mg tablet Take 1 Tab by mouth every twelve (12) hours for 7 days.  pseudoephedrine (SUDAFED) 30 mg tablet Take 60 mg by mouth every six (6) hours as needed for Congestion. No current facility-administered medications for this visit. No Known Allergies     Review of Systems: A complete review of systems was obtained, negative except as described above. Physical Exam:     Visit Vitals  /83   Pulse 83   Temp 97.5 °F (36.4 °C) (Oral)   Resp 16   Ht 5' 4\" (1.626 m)   Wt 224 lb (101.6 kg)   SpO2 98%   BMI 38.45 kg/m²     ECOG PS: 0  General: No distress  Eyes: PERRLA, anicteric sclerae  HENT: Atraumatic with normal appearance of ears and nose; OP clear  Neck: Supple; no thyromegaly   Lymphatic: No cervical, supraclavicular, or axillary adenopathy  Respiratory: exp rhonchi noted;, normal respiratory effort  CV: Normal rate, regular rhythm, no murmurs, no peripheral edema  GI: Soft, nondistended, no masses, no hepatomegaly, no splenomegaly. TTP in suprapubic region  MS: Digits without clubbing or cyanosis. Skin: No rashes, ecchymoses, or petechiae. Normal temperature, turgor, and texture. Neuro/Psych: Alert, oriented, appropriate affect, normal judgment/insight      Results:     Lab Results   Component Value Date/Time    WBC 20.0 (H) 01/10/2020 05:58 AM    HGB 12.7 01/10/2020 05:58 AM    HCT 39.4 01/10/2020 05:58 AM    PLATELET 153 72/17/3617 05:58 AM    MCV 91.2 01/10/2020 05:58 AM    ABS.  NEUTROPHILS 15.9 (H) 01/10/2020 05:58 AM     Lab Results   Component Value Date/Time    Sodium 139 01/11/2020 08:10 AM    Potassium 3.0 (L) 01/11/2020 08:10 AM    Chloride 108 01/11/2020 08:10 AM    CO2 27 01/11/2020 08:10 AM    Glucose 144 (H) 01/11/2020 08:10 AM    BUN 9 01/11/2020 08:10 AM    Creatinine 0.72 01/11/2020 08:10 AM    GFR est AA >60 01/11/2020 08:10 AM    GFR est non-AA >60 01/11/2020 08:10 AM    Calcium 8.4 (L) 01/11/2020 08:10 AM     Lab Results   Component Value Date/Time    Bilirubin, total 0.9 01/09/2020 03:03 PM    ALT (SGPT) 36 01/09/2020 03:03 PM    AST (SGOT) 24 01/09/2020 03:03 PM    Alk. phosphatase 134 (H) 01/09/2020 03:03 PM    Protein, total 6.7 01/09/2020 03:03 PM    Albumin 3.0 (L) 01/09/2020 03:03 PM    Globulin 3.7 01/09/2020 03:03 PM     Lab Results   Component Value Date/Time    Lipase 68 (L) 01/09/2020 03:03 PM     No results found for: INR, APTT, DDIMSQ, DDIME, 707941, Madolyn Mansi, FDPLT, Leafy Farrier, PRSFLT, [address], [address], [id number], [id number], [id number], 314847, 661444, 689104, INREXT, INREXT    No results found for: CEA, 995226, C199LT, C125, HKDW415, 2729LT, C153LT, PSA, PSALT, LDH, [id number], HCGTLT, AFP, CHRGLT    1/09/2020 CT ABD PELV W CONT  IMPRESSION:  1.  Ill-defined left renal hypodensity suspicious for renal cell carcinoma. Alternatively, this may represent pyelonephritis in the appropriate clinical  setting. Recommend clinical correlation and follow-up. MRI may provide more  information. 2.  Small lucencies in the visualized lumbar spine osseous structures. These are  nonspecific. However if the patient has a primary tumor, metastatic disease is  not excluded  3. Multiple other cystic lesions in the liver some which demonstrate internal  complexity. These may be further assessed with MRI as well.    1/11/20 abd/pelvis CT (renal protocol): FINDINGS:   LUNG BASES: Mild bibasilar atelectasis. INCIDENTALLY IMAGED HEART AND MEDIASTINUM: Unremarkable. LIVER: No mass or biliary dilatation. GALLBLADDER: Surgically absent. SPLEEN: No mass. PANCREAS: No mass or ductal dilatation. ADRENALS: Unremarkable. KIDNEYS: No calculus or hydronephrosis. Numerous bilateral simple and  hemorrhagic renal cysts. 2.6 x 2.1 cm complex mass in the interpolar region of  the left kidney, medially, demonstrates nodular mural enhancement. STOMACH: Unremarkable. SMALL BOWEL: No dilatation or wall thickening. COLON: No dilatation or wall thickening.   PERITONEUM: No ascites or pneumoperitoneum. RETROPERITONEUM: No lymphadenopathy or aortic aneurysm. BONES: Unchanged tiny nonspecific lucent bone lesions. . Grade 1 anterolisthesis  at L4-5. IMPRESSION:  2.6 cm complex solid/cystic left renal mass, suspicious for malignancy. Remaining simple and complex bilateral renal cysts are benign in appearance. Assessment and Recommendations:   82 yo female with no significant PMH admitted with fevers and diarrhea. 1. Left renal mass: 2.6cm in left kidney is suspicious for malignancy on recent renal protocol CT done 1/11/20. Urology recommends outpatient evaluation which has not yet been scheduled. I discussed with patient that most likely, we will need to repeat imaging in a few months. However, I would defer to Urology recommendations. -- Will have pt follow up with Dr. Manuel Hernandez in Urology for further recommendations. -- Return in 4 weeks for follow up (to follow up on bone scan results)    2. Lumbar spine osseous abnormalities: Per radiology report, these are concerning for possible metastatic disease if found to have primary tumor. However, upon review with radiologist, the lesions are quite small/nonspecific in L2, L5, T12 areas. Recommend consideration of bone scan given CT is suspicious for RCC in left kidney. -- Bone scan now    3. E. Coli UTI / pyelonephritis. Resp panel and flu negative. Treated with Ceftriaxone inpatient and then Cipro as outpatient.        Signed By: Kaylin Ramachandran MD

## 2020-01-14 LAB
BACTERIA SPEC CULT: NORMAL
SERVICE CMNT-IMP: NORMAL

## 2020-01-17 ENCOUNTER — OFFICE VISIT (OUTPATIENT)
Dept: ONCOLOGY | Age: 70
End: 2020-01-17

## 2020-01-17 VITALS
HEIGHT: 64 IN | HEART RATE: 83 BPM | SYSTOLIC BLOOD PRESSURE: 134 MMHG | WEIGHT: 224 LBS | TEMPERATURE: 97.5 F | RESPIRATION RATE: 16 BRPM | DIASTOLIC BLOOD PRESSURE: 83 MMHG | OXYGEN SATURATION: 98 % | BODY MASS INDEX: 38.24 KG/M2

## 2020-01-17 DIAGNOSIS — N28.89 LEFT RENAL MASS: Primary | ICD-10-CM

## 2020-01-17 DIAGNOSIS — N12 PYELONEPHRITIS: ICD-10-CM

## 2020-01-17 DIAGNOSIS — M89.9 BONE LESION: ICD-10-CM

## 2020-02-07 ENCOUNTER — HOSPITAL ENCOUNTER (OUTPATIENT)
Dept: NUCLEAR MEDICINE | Age: 70
Discharge: HOME | End: 2020-02-07
Attending: INTERNAL MEDICINE
Payer: COMMERCIAL

## 2020-02-07 DIAGNOSIS — M89.9 BONE LESION: ICD-10-CM

## 2020-02-07 PROCEDURE — 78306 BONE IMAGING WHOLE BODY: CPT

## 2020-02-07 PROCEDURE — A9503 TC99M MEDRONATE: HCPCS

## 2020-02-10 NOTE — PROGRESS NOTES
[facility] at [facility]  [address] [facility]  Pedro [phone]  F: [phone]      Reason for Visit:   Sheryle Gibbons is a 81 y.o. female who comes in for f/u of left renal mass. Hematology / Oncology Treatment History:   n/a    History of Present Illness:   Ms. Jadon Sorensen is a 82 y/o female who p/w fevers, abdominal pain, n/v and diarrhea, found to have possible left renal mass. Pt reports fever has been present for 2 weeks with more recent development of of vomiting and diarrhea. She has had an ongoing sinus infection x 4 months that has been treated with numerous abx; seen by ENT on . ED labs notable for WBC 26, ANC 22.1, UA with 3+ bacteria. CT abd reveals ill defined left renal hypodensity concerning for renal cell carcinoma vs pylenephritis. Patient had blood and urine cultures sent, was started on broad spectrum antibiotics. She states she is feeling better, but still has midline lower abdominal pain, loose stools, productive cough. Denies nausea or vomiting currently. Able to eat small portion of breakfast this am.  at bedside. Nonsmoker. Interval History: Patient reports resolution of prior suprapubic pain. No back pain. No more fevers. No hematuria. Her prior fatigue has improved. She does report having a sinus infection, taking pseudophed. She is scheduled to see Dr. Mady Reveles in [facility] in 3/2/20. PMHx: None  PSurgHx: Cholecystectomy  SHx: Nonsmoker, occasional EtOH. Lives with . Her adult daughter and fiance live with them as well. FHx: Negative for cancer. No past medical history on file. Past Surgical History:   Procedure Laterality Date    HX CHOLECYSTECTOMY      HX HEENT        Social History     Tobacco Use    Smoking status: Former Smoker    Smokeless tobacco: Never Used   Substance Use Topics    Alcohol use: Not on file      No family history on file.   Current Outpatient Medications Medication Sig    atorvastatin (LIPITOR) 20 mg tablet Take 1 Tab by mouth daily for 30 days.  pseudoephedrine (SUDAFED) 30 mg tablet Take 60 mg by mouth every six (6) hours as needed for Congestion. No current facility-administered medications for this visit. No Known Allergies     Review of Systems: A complete review of systems was obtained, negative except as described above. Physical Exam:     There were no vitals taken for this visit. ECOG PS: 0  General: No distress  Eyes: PERRLA, anicteric sclerae  HENT: Atraumatic with normal appearance of ears and nose; OP clear  Neck: Supple; no thyromegaly   Lymphatic: No cervical, supraclavicular, or axillary adenopathy  Respiratory: exp rhonchi noted;, normal respiratory effort  CV: Normal rate, regular rhythm, no murmurs, no peripheral edema  GI: Soft, nondistended, no masses, no hepatomegaly, no splenomegaly. TTP in suprapubic region  MS: Digits without clubbing or cyanosis. Skin: No rashes, ecchymoses, or petechiae. Normal temperature, turgor, and texture. Neuro/Psych: Alert, oriented, appropriate affect, normal judgment/insight      Results:     Lab Results   Component Value Date/Time    WBC 20.0 (H) 01/10/2020 05:58 AM    HGB 12.7 01/10/2020 05:58 AM    HCT 39.4 01/10/2020 05:58 AM    PLATELET 968 01/32/0828 05:58 AM    MCV 91.2 01/10/2020 05:58 AM    ABS.  NEUTROPHILS 15.9 (H) 01/10/2020 05:58 AM     Lab Results   Component Value Date/Time    Sodium 139 01/11/2020 08:10 AM    Potassium 3.0 (L) 01/11/2020 08:10 AM    Chloride 108 01/11/2020 08:10 AM    CO2 27 01/11/2020 08:10 AM    Glucose 144 (H) 01/11/2020 08:10 AM    BUN 9 01/11/2020 08:10 AM    Creatinine 0.72 01/11/2020 08:10 AM    GFR est AA >60 01/11/2020 08:10 AM    GFR est non-AA >60 01/11/2020 08:10 AM    Calcium 8.4 (L) 01/11/2020 08:10 AM     Lab Results   Component Value Date/Time    Bilirubin, total 0.9 01/09/2020 03:03 PM    ALT (SGPT) 36 01/09/2020 03:03 PM    AST (SGOT) 24 01/09/2020 03:03 PM    Alk. phosphatase 134 (H) 01/09/2020 03:03 PM    Protein, total 6.7 01/09/2020 03:03 PM    Albumin 3.0 (L) 01/09/2020 03:03 PM    Globulin 3.7 01/09/2020 03:03 PM     Lab Results   Component Value Date/Time    Lipase 68 (L) 01/09/2020 03:03 PM     No results found for: INR, APTT, DDIMSQ, DDIME, [id number], Dane Southerly, FDPLT, Wicho Luis Antonio, PRSFLT, [address], [address], [id number], Dominik Nahum 5334, [id number], [id number], [id number], 637998, INREXT, INREXT    No results found for: CEA, 261137, C199LT, C125, ORVH052, 2729LT, C153LT, PSA, PSALT, LDH, [id number], HCGTLT, AFP, CHRGLT    1/09/2020 CT ABD PELV W CONT  IMPRESSION:  1.  Ill-defined left renal hypodensity suspicious for renal cell carcinoma. Alternatively, this may represent pyelonephritis in the appropriate clinical  setting. Recommend clinical correlation and follow-up. MRI may provide more  information. 2.  Small lucencies in the visualized lumbar spine osseous structures. These are  nonspecific. However if the patient has a primary tumor, metastatic disease is  not excluded  3. Multiple other cystic lesions in the liver some which demonstrate internal  complexity. These may be further assessed with MRI as well.    1/11/20 abd/pelvis CT (renal protocol): FINDINGS:   LUNG BASES: Mild bibasilar atelectasis. INCIDENTALLY IMAGED HEART AND MEDIASTINUM: Unremarkable. LIVER: No mass or biliary dilatation. GALLBLADDER: Surgically absent. SPLEEN: No mass. PANCREAS: No mass or ductal dilatation. ADRENALS: Unremarkable. KIDNEYS: No calculus or hydronephrosis. Numerous bilateral simple and  hemorrhagic renal cysts. 2.6 x 2.1 cm complex mass in the interpolar region of  the left kidney, medially, demonstrates nodular mural enhancement. STOMACH: Unremarkable. SMALL BOWEL: No dilatation or wall thickening. COLON: No dilatation or wall thickening. PERITONEUM: No ascites or pneumoperitoneum.   RETROPERITONEUM: No lymphadenopathy or aortic aneurysm. BONES: Unchanged tiny nonspecific lucent bone lesions. . Grade 1 anterolisthesis  at L4-5. IMPRESSION:  2.6 cm complex solid/cystic left renal mass, suspicious for malignancy. Remaining simple and complex bilateral renal cysts are benign in appearance. 2/7/20 Nuclear med bone scan:  FINDINGS: Degenerative changes are noted in the shoulder joints bilaterally,  left ankle, and feet bilaterally. Degenerative changes are also noted There is  no evidence for fracture or metastatic disease. Incidental renal imaging shows  no abnormality. IMPRESSION: Degenerative changes. The small lytic lucencies seen on CT are  likely too small for accurate characterization by bone scan. Assessment and Recommendations:   82 yo female comes in for f/u of left renal mass and bone lesions. 1. Left renal mass: 2.6cm in left kidney was suspicious for malignancy on recent renal protocol CT done 1/11/20. Urology recommended outpatient evaluation which pt is awaiting. I discussed with patient that most likely, we will need to repeat imaging in a few months. However, I would defer to Urology recommendations. -- Scheduled to see Dr. Felipe Lopez with [facility] on 3/2/20.  -- Return in 3 months    2. Lumbar spine osseous abnormalities: Per radiology report, these are concerning for possible metastatic disease if found to have primary tumor. However, upon review with radiologist, the lesions are quite small/nonspecific in L2, L5, T12 areas. Bone scan negative, but these lesions may be too small to definitely rule out malignancy. 3. H/o E. Coli UTI / pyelonephritis. Resp panel and flu negative. Treated with Ceftriaxone inpatient and then Cipro as outpatient with resolution of symptoms. 4. Obesity: Pt states she will start using the elliptical because she has not been exercising much.     Signed By: Ifeanyi Becerra MD

## 2020-02-14 ENCOUNTER — OFFICE VISIT (OUTPATIENT)
Dept: ONCOLOGY | Age: 70
End: 2020-02-14

## 2020-02-14 VITALS
OXYGEN SATURATION: 95 % | RESPIRATION RATE: 16 BRPM | HEART RATE: 71 BPM | DIASTOLIC BLOOD PRESSURE: 92 MMHG | TEMPERATURE: 97.3 F | WEIGHT: 223 LBS | SYSTOLIC BLOOD PRESSURE: 166 MMHG | HEIGHT: 64 IN | BODY MASS INDEX: 38.07 KG/M2

## 2020-02-14 DIAGNOSIS — M89.9 BONE LESION: ICD-10-CM

## 2020-02-14 DIAGNOSIS — E66.01 SEVERE OBESITY (HCC): ICD-10-CM

## 2020-02-14 DIAGNOSIS — N28.89 LEFT RENAL MASS: Primary | ICD-10-CM

## 2020-05-14 NOTE — PROGRESS NOTES
[facility] at [place] [address]  [address] [facility], [address] [place] [address]  Nabila Mix: [phone]  F: [phone]      Reason for Visit:   Ryanne Loja is a 78 y.o. female who is seen by synchronous (real-time) audio-video technology on 5/15/2020 for follow up of left renal mass. Hematology / Oncology Treatment History:   n/a    History of Present Illness:   Ms. Asya Magallanes is a 78 y.o. female who is seen for follow up of a left renal mass. She was hospitalized in Jan 2020 with fevers, abdominal pain, n/v and diarrhea, found to have possible left renal mass. At that time, she had suffered with an ongoing sinus infection x 4 months that has been treated with numerous abx; seen by ENT on 1/6. ED labs in Jan 2020 notable for WBC 26, ANC 22.1, UA with 3+ bacteria. CT abd revealed ill defined left renal hypodensity concerning for renal cell carcinoma vs pylenephritis. Patient had blood and urine cultures sent; was treated with broad spectrum antibiotics. She eventually had resolution of prior suprapubic pain, back pain, fevers. No hematuria. Her prior fatigue had improved. Interval History: Since our last visit, she has see Dr. Carmen Sam in [facility] in 3/2/20. Underwent repeat CT and was told the prior mass has now resolved. She is feeling well overall without any fevers, chills, sweats. PMHx: None  PSurgHx: Cholecystectomy  SHx: Nonsmoker, occasional EtOH. Lives with . Her adult daughter and fiance live with them as well. FHx: Negative for cancer. No past medical history on file. Past Surgical History:   Procedure Laterality Date    HX CHOLECYSTECTOMY      HX HEENT        Social History     Tobacco Use    Smoking status: Former Smoker    Smokeless tobacco: Never Used   Substance Use Topics    Alcohol use: Not on file      No family history on file.   Current Outpatient Medications   Medication Sig    pseudoephedrine (SUDAFED) 30 mg tablet Take 60 mg by mouth every six (6) hours as needed for Congestion. No current facility-administered medications for this visit. No Known Allergies     Review of Systems: A complete review of systems was obtained, negative except as described above. Physical Exam:     Due to this being a TeleHealth evaluation, many elements of the physical examination are unable to be assessed. Constitutional: Appears well-developed and well-nourished in no apparent distress   Mental status: Alert and awake, Oriented to person/place/time, Able to follow commands  Eyes: EOM normal, Sclera normal, No visible ocular discharge  HENT: Normocephalic, atraumatic; Mouth/Throat: Moist mucous membranes, External Ears normal  Neck: No visualized mass  Pulmonary/Chest: Respiratory effort normal, No visualized signs of difficulty breathing or respiratory distress   Musculoskeletal: Normal gait with no signs of ataxia, Normal range of motion of neck  Neurological: No facial asymmetry (Cranial nerve 7 motor function), No gaze palsy  Skin: No significant exanthematous lesions or discoloration noted on facial skin  Psychiatric: Normal affect, normal judgment/insight. No hallucinations         Results:     Lab Results   Component Value Date/Time    WBC 20.0 (H) 01/10/2020 05:58 AM    HGB 12.7 01/10/2020 05:58 AM    HCT 39.4 01/10/2020 05:58 AM    PLATELET 546 53/24/4062 05:58 AM    MCV 91.2 01/10/2020 05:58 AM    ABS.  NEUTROPHILS 15.9 (H) 01/10/2020 05:58 AM     Lab Results   Component Value Date/Time    Sodium 139 01/11/2020 08:10 AM    Potassium 3.0 (L) 01/11/2020 08:10 AM    Chloride 108 01/11/2020 08:10 AM    CO2 27 01/11/2020 08:10 AM    Glucose 144 (H) 01/11/2020 08:10 AM    BUN 9 01/11/2020 08:10 AM    Creatinine 0.72 01/11/2020 08:10 AM    GFR est AA >60 01/11/2020 08:10 AM    GFR est non-AA >60 01/11/2020 08:10 AM    Calcium 8.4 (L) 01/11/2020 08:10 AM     Lab Results   Component Value Date/Time    Bilirubin, total 0.9 01/09/2020 03:03 PM    ALT (SGPT) 36 01/09/2020 03:03 PM    AST (SGOT) 24 01/09/2020 03:03 PM    Alk. phosphatase 134 (H) 01/09/2020 03:03 PM    Protein, total 6.7 01/09/2020 03:03 PM    Albumin 3.0 (L) 01/09/2020 03:03 PM    Globulin 3.7 01/09/2020 03:03 PM     Lab Results   Component Value Date/Time    Lipase 68 (L) 01/09/2020 03:03 PM     No results found for: INR, APTT, DDIMSQ, DDIME, 642131, Clark Ahle, FDPLT, Janeal Blinks, PRSFLT, [address], [address], [id number], [id number], [id number], 737006, 262056, 419797, INREXT, INREXT    No results found for: CEA, 290303, C199LT, C125, ZZLR811, 2729LT, C153LT, PSA, PSALT, LDH, [id number], HCGTLT, AFP, CHRGLT    1/09/2020 CT ABD PELV W CONT  IMPRESSION:  1.  Ill-defined left renal hypodensity suspicious for renal cell carcinoma. Alternatively, this may represent pyelonephritis in the appropriate clinical  setting. Recommend clinical correlation and follow-up. MRI may provide more  information. 2.  Small lucencies in the visualized lumbar spine osseous structures. These are  nonspecific. However if the patient has a primary tumor, metastatic disease is  not excluded  3. Multiple other cystic lesions in the liver some which demonstrate internal  complexity. These may be further assessed with MRI as well.    1/11/20 abd/pelvis CT (renal protocol): FINDINGS:   LUNG BASES: Mild bibasilar atelectasis. INCIDENTALLY IMAGED HEART AND MEDIASTINUM: Unremarkable. LIVER: No mass or biliary dilatation. GALLBLADDER: Surgically absent. SPLEEN: No mass. PANCREAS: No mass or ductal dilatation. ADRENALS: Unremarkable. KIDNEYS: No calculus or hydronephrosis. Numerous bilateral simple and  hemorrhagic renal cysts. 2.6 x 2.1 cm complex mass in the interpolar region of  the left kidney, medially, demonstrates nodular mural enhancement. STOMACH: Unremarkable. SMALL BOWEL: No dilatation or wall thickening. COLON: No dilatation or wall thickening.   PERITONEUM: No ascites or pneumoperitoneum. RETROPERITONEUM: No lymphadenopathy or aortic aneurysm. BONES: Unchanged tiny nonspecific lucent bone lesions. . Grade 1 anterolisthesis  at L4-5. IMPRESSION:  2.6 cm complex solid/cystic left renal mass, suspicious for malignancy. Remaining simple and complex bilateral renal cysts are benign in appearance. 2/7/20 Nuclear med bone scan:  FINDINGS: Degenerative changes are noted in the shoulder joints bilaterally,  left ankle, and feet bilaterally. Degenerative changes are also noted There is  no evidence for fracture or metastatic disease. Incidental renal imaging shows  no abnormality. IMPRESSION: Degenerative changes. The small lytic lucencies seen on CT are  likely too small for accurate characterization by bone scan. Assessment and Recommendations:   82 yo female comes in for f/u of left renal mass and bone lesions. 1. History of pyelonephritis / Left renal mass: 2.6cm in left kidney was initially suspicious for malignancy on renal protocol CT done 1/11/20. Urology evaluated and repeated CT which showed resolution of prior mass - consistent with treated pyelonephritis. Pt is scheduled for repeat CT in 12 months to follow up Bosniak2 cysts. -- Repeat CT in 12 months per [facility] (Dr. Tressa Peguero will order)  -- Continue follow up with Dr. Tressa Peguero with [facility]  -- Follow up as needed. 2. Lumbar spine osseous abnormalities: Per radiology report, these were concerning for possible metastatic disease IF found to have primary tumor. However, upon review with radiologist, the lesions are quite small/nonspecific in L2, L5, T12 areas. Bone scan negative, but these lesions may be too small to definitely rule out malignancy. Given resolution of prior renal mass, these small lesions are nonspecific. 3. H/o E. Coli UTI / pyelonephritis. Resp panel and flu negative. Treated with Ceftriaxone inpatient and then Cipro as outpatient with resolution of symptoms.      4. Obesity: Pt states she will start using the elliptical because she has not been exercising much. 5. H/o Leukocytosis: Was 2/2 pyelonephritis. Discussed with patient she should have this rechecked at some point when established with a PCP. Total physician time spent on this encounter was 40 minutes. I was in the office while conducting this encounter. The patient was at her home. Consent:  She and/or her healthcare decision maker is aware that this patient-initiated Telehealth encounter is a billable service, with coverage as determined by her insurance carrier. She is aware that she may receive a bill and has provided verbal consent to proceed:  YES-consent obtained within past 12 months. Pursuant to the emergency declaration under the [facility], [address] waiver authority and the [employer] Act, this Virtual  Visit was conducted, with patient's (and/or legal guardian's) consent, to reduce the patient's risk of exposure to COVID-19 and provide necessary medical care. Services were provided through a video synchronous discussion virtually to substitute for in-person visit.     Signed By: Cinthia Donahue MD   05/15/20

## 2020-05-15 ENCOUNTER — VIRTUAL VISIT (OUTPATIENT)
Dept: ONCOLOGY | Age: 70
End: 2020-05-15

## 2020-05-15 DIAGNOSIS — Z86.2 HISTORY OF LEUKOCYTOSIS: ICD-10-CM

## 2020-05-15 DIAGNOSIS — N12 PYELONEPHRITIS: ICD-10-CM

## 2020-05-15 DIAGNOSIS — N28.89 LEFT RENAL MASS: Primary | ICD-10-CM

## 2020-05-15 DIAGNOSIS — M89.9 BONE LESION: ICD-10-CM

## 2020-05-15 RX ORDER — BISMUTH SUBSALICYLATE 262 MG
1 TABLET,CHEWABLE ORAL DAILY
COMMUNITY

## 2020-05-15 RX ORDER — VITAMIN E 1000 UNIT
1000 CAPSULE ORAL DAILY
COMMUNITY

## 2020-05-15 NOTE — PROGRESS NOTES
Jessica Mehta is a 78 y.o. female here for follow up of renal mass. Patient with no complaints of pain at this time.

## 2020-12-16 ENCOUNTER — TRANSCRIBE ORDER (OUTPATIENT)
Dept: SCHEDULING | Age: 70
End: 2020-12-16

## 2020-12-16 DIAGNOSIS — J32.9 CHRONIC SINUSITIS: ICD-10-CM

## 2020-12-16 DIAGNOSIS — R09.81 NASAL CONGESTION: Primary | ICD-10-CM

## 2020-12-16 DIAGNOSIS — J01.91 ACUTE RECURRENT SINUSITIS, UNSPECIFIED LOCATION: ICD-10-CM

## 2020-12-16 DIAGNOSIS — J34.3 HYPERTROPHY OF NASAL TURBINATES: ICD-10-CM

## 2020-12-23 ENCOUNTER — HOSPITAL ENCOUNTER (OUTPATIENT)
Dept: CT IMAGING | Age: 70
Discharge: HOME | End: 2020-12-23
Attending: OTOLARYNGOLOGY
Payer: MEDICARE

## 2020-12-23 DIAGNOSIS — J32.9 CHRONIC SINUSITIS: ICD-10-CM

## 2020-12-23 DIAGNOSIS — J34.3 HYPERTROPHY OF NASAL TURBINATES: ICD-10-CM

## 2020-12-23 DIAGNOSIS — R09.81 NASAL CONGESTION: ICD-10-CM

## 2020-12-23 DIAGNOSIS — J01.91 ACUTE RECURRENT SINUSITIS, UNSPECIFIED LOCATION: ICD-10-CM

## 2020-12-23 PROCEDURE — 70486 CT MAXILLOFACIAL W/O DYE: CPT

## 2021-08-04 ENCOUNTER — APPOINTMENT (OUTPATIENT)
Dept: CT IMAGING | Age: 71
End: 2021-08-04
Attending: NURSE PRACTITIONER
Payer: MEDICARE

## 2021-08-04 ENCOUNTER — HOSPITAL ENCOUNTER (EMERGENCY)
Age: 71
Discharge: HOME | End: 2021-08-04
Attending: EMERGENCY MEDICINE
Payer: MEDICARE

## 2021-08-04 VITALS
WEIGHT: 230 LBS | BODY MASS INDEX: 39.27 KG/M2 | TEMPERATURE: 98.7 F | HEIGHT: 64 IN | RESPIRATION RATE: 16 BRPM | OXYGEN SATURATION: 97 % | HEART RATE: 67 BPM | DIASTOLIC BLOOD PRESSURE: 98 MMHG | SYSTOLIC BLOOD PRESSURE: 165 MMHG

## 2021-08-04 DIAGNOSIS — M17.12 OSTEOARTHRITIS OF LEFT KNEE, UNSPECIFIED OSTEOARTHRITIS TYPE: Primary | ICD-10-CM

## 2021-08-04 PROCEDURE — 73700 CT LOWER EXTREMITY W/O DYE: CPT

## 2021-08-04 PROCEDURE — 99283 EMERGENCY DEPT VISIT LOW MDM: CPT

## 2021-08-04 PROCEDURE — 74011250637 HC RX REV CODE- 250/637: Performed by: NURSE PRACTITIONER

## 2021-08-04 RX ORDER — HYDROCODONE BITARTRATE AND ACETAMINOPHEN 5; 325 MG/1; MG/1
1 TABLET ORAL ONCE
Status: COMPLETED | OUTPATIENT
Start: 2021-08-04 | End: 2021-08-04

## 2021-08-04 RX ORDER — IBUPROFEN 600 MG/1
600 TABLET ORAL
Qty: 20 TABLET | Refills: 0 | Status: SHIPPED | OUTPATIENT
Start: 2021-08-04

## 2021-08-04 RX ADMIN — HYDROCODONE BITARTRATE AND ACETAMINOPHEN 1 TABLET: 5; 325 TABLET ORAL at 17:58

## 2021-08-04 NOTE — Clinical Note
[address]  [facility] EMERGENCY DEPT  [address] [id number]  [phone]    Work/School Note    Date: 8/4/2021    To Whom It May concern:    Suzie Fox was seen and treated today in the emergency room by the following provider(s):  Attending Provider: Trini Quarles DO  Nurse Practitioner: John Ramos NP. Suzie Fox is excused from work/school on 8/4/2021 through 8/7/2021. She is medically clear to return to work/school on 8/8/2021.         Sincerely,          Marycruz Enrique NP

## 2021-08-04 NOTE — ED PROVIDER NOTES
This is a 57-year-old female who presents to the emergency room complaints of left knee pain. Patient state about a week ago she twisted her left knee which caused her pain. Patient took anti-inflammatories as well as iced it and it got better. Today patient states she was trying to come out of her house and close the door when she twisted her left knee causing excruciating pain and the inability to weight-bear. Patient states she again applied ice with no relief of her symptoms so she comes to the emergency room for evaluation. Patient is unable to weight-bear at the current point, sitting in a wheelchair. States that the left knee is swollen and very painful to touch anteriorly. Denies any falls, hitting her head, loss of consciousness. Denies any chest pain, shortness of breath or dizziness. No nausea or vomiting, no fevers or chills. No prior injury to the left knee other than last week when she was walking on uneven ground which caused some pain. Did not seek medical attention at that time. There are no further complaints at this time. None  No past medical history on file. Past Surgical History:  No date: HX CHOLECYSTECTOMY  No date: HX HEENT             No past medical history on file. Past Surgical History:   Procedure Laterality Date    HX CHOLECYSTECTOMY      HX HEENT           No family history on file.     Social History     Socioeconomic History    Marital status:      Spouse name: Not on file    Number of children: Not on file    Years of education: Not on file    Highest education level: Not on file   Occupational History    Not on file   Tobacco Use    Smoking status: Former Smoker    Smokeless tobacco: Never Used   Substance and Sexual Activity    Alcohol use: Not on file    Drug use: Not on file    Sexual activity: Not on file   Other Topics Concern    Not on file   Social History Narrative    Not on file     Social Determinants of Health     Financial Resource Strain:     Difficulty of Paying Living Expenses:    Food Insecurity:     Worried About Running Out of Food in the Last Year:     [address] in the Last Year:    Transportation Needs:     Lack of Transportation (Medical):  Lack of Transportation (Non-Medical):    Physical Activity:     Days of Exercise per Week:     Minutes of Exercise per Session:    Stress:     Feeling of Stress :    Social Connections:     Frequency of Communication with Friends and Family:     Frequency of Social Gatherings with Friends and Family:     Attends [employer] Services:     Active Member of Clubs or Organizations:     Attends Club or Organization Meetings:     Marital Status:    Intimate Partner Violence:     Fear of Current or Ex-Partner:     Emotionally Abused:     Physically Abused:     Sexually Abused: ALLERGIES: Patient has no known allergies. Review of Systems   Constitutional: Positive for activity change (difficulty ambulating and weight bearing to the left lower extremity). Negative for fatigue. HENT: Negative for congestion and trouble swallowing. Eyes: Negative for redness. Respiratory: Negative for chest tightness and shortness of breath. Cardiovascular: Negative for chest pain. Gastrointestinal: Negative for nausea and vomiting. Endocrine: Negative. Genitourinary: Negative for difficulty urinating. Musculoskeletal: Positive for arthralgias (left knee pain) and joint swelling (left knee). Negative for back pain, neck pain and neck stiffness. Skin: Negative for color change, pallor, rash and wound. Allergic/Immunologic: Negative. Neurological: Negative for dizziness, speech difficulty and weakness. Hematological: Does not bruise/bleed easily. Psychiatric/Behavioral: Negative for behavioral problems. The patient is not nervous/anxious.         Vitals:    08/04/21 1707   BP: (!) 165/98   Pulse: 67   Resp: 16   Temp: 98.7 °F (37.1 °C)   SpO2: 97% Weight: 104.3 kg (230 lb)   Height: 5' 4\" (1.626 m)            Physical Exam  Vitals and nursing note reviewed. Constitutional:       General: She is not in acute distress. Appearance: Normal appearance. She is well-developed. She is not ill-appearing. HENT:      Head: Normocephalic and atraumatic. Right Ear: External ear normal.      Left Ear: External ear normal.      Nose: Nose normal.      Mouth/Throat:      Mouth: Mucous membranes are moist.   Eyes:      General:         Right eye: No discharge. Left eye: No discharge. Conjunctiva/sclera: Conjunctivae normal.      Pupils: Pupils are equal, round, and reactive to light. Neck:      Vascular: No JVD. Trachea: No tracheal deviation. Cardiovascular:      Rate and Rhythm: Normal rate and regular rhythm. Pulses: Normal pulses. Pulmonary:      Effort: Pulmonary effort is normal. No respiratory distress. Abdominal:      General: There is no distension. Genitourinary:     Comments: Deferred    Musculoskeletal:         General: Swelling (left knee) and tenderness (left knee) present. Cervical back: Normal range of motion and neck supple. Comments: Left knee with mild swelling and pain, Patient states she is unable to weight bear. No calf pain. Skin:     General: Skin is warm and dry. Capillary Refill: Capillary refill takes less than 2 seconds. Coloration: Skin is not pale. Findings: No erythema or rash. Neurological:      General: No focal deficit present. Mental Status: She is alert and oriented to person, place, and time. Motor: No weakness. Coordination: Coordination normal.   Psychiatric:         Mood and Affect: Mood normal.         Behavior: Behavior normal.         Thought Content:  Thought content normal.         Judgment: Judgment normal.          MDM  Number of Diagnoses or Management Options  Osteoarthritis of left knee, unspecified osteoarthritis type: new and requires workup  Diagnosis management comments: Differential diagnosis includes knee sprain, Baker's cyst, musculoskeletal pain and others. Discharged home and follow-up with PCP. Follow-up with orthopedics as an outpatient. Return to the emergency room with worsening symptoms. Patient in agreement with plan of care. Amount and/or Complexity of Data Reviewed  Tests in the radiology section of CPT®: ordered and reviewed         Labs Reviewed - No data to display  CT LOW EXT LT WO CONT    Result Date: 8/4/2021  No acute fracture nor dislocation on the left knee. Moderate-severe tricompartmental osteoarthritis, most pronounced medially. Small joint effusion likely degenerative     7:24 PM  Pt has been reexamined. Pt has no new complaints, changes or physical findings. Care plan outlined and precautions discussed. All available results were reviewed with pt. All medications were reviewed with pt. All of pt's questions and concerns were addressed. Pt agrees to F/U as instructed and agrees to return to ED upon further deterioration. Pt is ready to go home. Nic Grier NP    Please note that this dictation was completed with [employer], the computer voice recognition software. Quite often unanticipated grammatical, syntax, homophones, and other interpretive errors are inadvertently transcribed by the computer software. Please disregard these errors. Please excuse any errors that have escaped final proofreading. Thank you.     Procedures

## 2021-08-04 NOTE — ED TRIAGE NOTES
Pt tamara with left knee pain onset one week ago after \"twisting it\". States it was healing however she re injured it today and c/o worsening pain.

## 2022-03-18 PROBLEM — I21.A1 TYPE 2 ACUTE MYOCARDIAL INFARCTION (HCC): Status: ACTIVE | Noted: 2020-01-10

## 2022-03-19 PROBLEM — R11.2 NAUSEA VOMITING AND DIARRHEA: Status: ACTIVE | Noted: 2020-01-09

## 2022-03-19 PROBLEM — E66.9 OBESE: Status: ACTIVE | Noted: 2020-01-10

## 2022-03-19 PROBLEM — C79.51 BONE METASTASES: Status: ACTIVE | Noted: 2020-01-10

## 2022-03-19 PROBLEM — R19.7 NAUSEA VOMITING AND DIARRHEA: Status: ACTIVE | Noted: 2020-01-09

## 2022-03-19 PROBLEM — D72.829 LEUKOCYTOSIS: Status: ACTIVE | Noted: 2020-01-10

## 2022-03-19 PROBLEM — E66.01 SEVERE OBESITY (HCC): Status: ACTIVE | Noted: 2020-02-14

## 2022-03-19 PROBLEM — N12 PYELONEPHRITIS: Status: ACTIVE | Noted: 2020-01-10

## 2022-03-19 PROBLEM — N32.89 BLADDER MASS: Status: ACTIVE | Noted: 2020-01-10

## 2022-03-19 PROBLEM — A41.9 SEPSIS (HCC): Status: ACTIVE | Noted: 2020-01-10

## 2022-03-20 PROBLEM — N39.0 UTI (URINARY TRACT INFECTION): Status: ACTIVE | Noted: 2020-01-10

## 2022-03-20 PROBLEM — C78.7 LIVER METASTASES: Status: ACTIVE | Noted: 2020-01-10

## 2022-03-20 PROBLEM — R50.9 FEVER: Status: ACTIVE | Noted: 2020-01-10

## 2024-10-25 ENCOUNTER — APPOINTMENT (OUTPATIENT)
Facility: HOSPITAL | Age: 74
DRG: 689 | End: 2024-10-25
Payer: MEDICARE

## 2024-10-25 ENCOUNTER — HOSPITAL ENCOUNTER (INPATIENT)
Facility: HOSPITAL | Age: 74
LOS: 3 days | Discharge: HOME | DRG: 689 | End: 2024-10-28
Attending: STUDENT IN AN ORGANIZED HEALTH CARE EDUCATION/TRAINING PROGRAM | Admitting: INTERNAL MEDICINE
Payer: MEDICARE

## 2024-10-25 DIAGNOSIS — A41.9 SEPTICEMIA (HCC): ICD-10-CM

## 2024-10-25 DIAGNOSIS — N12 PYELONEPHRITIS: Primary | ICD-10-CM

## 2024-10-25 PROBLEM — N10 ACUTE PYELONEPHRITIS: Status: ACTIVE | Noted: 2024-10-25

## 2024-10-25 LAB
ALBUMIN SERPL-MCNC: 2.6 G/DL (ref 3.5–5)
ALBUMIN SERPL-MCNC: 2.7 G/DL (ref 3.5–5)
ALBUMIN/GLOB SERPL: 0.5 (ref 1.1–2.2)
ALBUMIN/GLOB SERPL: 0.5 (ref 1.1–2.2)
ALP SERPL-CCNC: 113 U/L (ref 45–117)
ALP SERPL-CCNC: 120 U/L (ref 45–117)
ALT SERPL-CCNC: 69 U/L (ref 12–78)
ALT SERPL-CCNC: 82 U/L (ref 12–78)
ANION GAP SERPL CALC-SCNC: 6 MMOL/L (ref 2–12)
ANION GAP SERPL CALC-SCNC: 8 MMOL/L (ref 2–12)
APPEARANCE UR: ABNORMAL
AST SERPL-CCNC: 77 U/L (ref 15–37)
AST SERPL-CCNC: 85 U/L (ref 15–37)
BACTERIA URNS QL MICRO: NEGATIVE /HPF
BASOPHILS # BLD: 0 K/UL (ref 0–0.1)
BASOPHILS NFR BLD: 0 % (ref 0–1)
BILIRUB SERPL-MCNC: 0.9 MG/DL (ref 0.2–1)
BILIRUB SERPL-MCNC: 1 MG/DL (ref 0.2–1)
BILIRUB UR QL: NEGATIVE
BUN SERPL-MCNC: 18 MG/DL (ref 6–20)
BUN SERPL-MCNC: 24 MG/DL (ref 6–20)
BUN/CREAT SERPL: 22 (ref 12–20)
BUN/CREAT SERPL: 24 (ref 12–20)
CALCIUM SERPL-MCNC: 9.1 MG/DL (ref 8.5–10.1)
CALCIUM SERPL-MCNC: 9.4 MG/DL (ref 8.5–10.1)
CHLORIDE SERPL-SCNC: 104 MMOL/L (ref 97–108)
CHLORIDE SERPL-SCNC: 108 MMOL/L (ref 97–108)
CO2 SERPL-SCNC: 24 MMOL/L (ref 21–32)
CO2 SERPL-SCNC: 28 MMOL/L (ref 21–32)
COLOR UR: ABNORMAL
CREAT SERPL-MCNC: 0.83 MG/DL (ref 0.55–1.02)
CREAT SERPL-MCNC: 1.02 MG/DL (ref 0.55–1.02)
DIFFERENTIAL METHOD BLD: ABNORMAL
EOSINOPHIL # BLD: 0 K/UL (ref 0–0.4)
EOSINOPHIL NFR BLD: 0 % (ref 0–7)
EPITH CASTS URNS QL MICRO: ABNORMAL /LPF
ERYTHROCYTE [DISTWIDTH] IN BLOOD BY AUTOMATED COUNT: 13.5 % (ref 11.5–14.5)
GLOBULIN SER CALC-MCNC: 4.9 G/DL (ref 2–4)
GLOBULIN SER CALC-MCNC: 5 G/DL (ref 2–4)
GLUCOSE SERPL-MCNC: 118 MG/DL (ref 65–100)
GLUCOSE SERPL-MCNC: 83 MG/DL (ref 65–100)
GLUCOSE UR STRIP.AUTO-MCNC: NEGATIVE MG/DL
HCT VFR BLD AUTO: 44.2 % (ref 35–47)
HGB BLD-MCNC: 15.2 G/DL (ref 11.5–16)
HGB UR QL STRIP: ABNORMAL
HYALINE CASTS URNS QL MICRO: ABNORMAL /LPF (ref 0–5)
IMM GRANULOCYTES # BLD AUTO: 0 K/UL
IMM GRANULOCYTES NFR BLD AUTO: 0 %
KETONES UR QL STRIP.AUTO: ABNORMAL MG/DL
LACTATE BLD-SCNC: 0.89 MMOL/L (ref 0.4–2)
LEUKOCYTE ESTERASE UR QL STRIP.AUTO: ABNORMAL
LIPASE SERPL-CCNC: 237 U/L (ref 13–75)
LYMPHOCYTES # BLD: 1.3 K/UL (ref 0.8–3.5)
LYMPHOCYTES NFR BLD: 5 % (ref 12–49)
MCH RBC QN AUTO: 30.3 PG (ref 26–34)
MCHC RBC AUTO-ENTMCNC: 34.4 G/DL (ref 30–36.5)
MCV RBC AUTO: 88.2 FL (ref 80–99)
METAMYELOCYTES NFR BLD MANUAL: 1 %
MONOCYTES # BLD: 0.8 K/UL (ref 0–1)
MONOCYTES NFR BLD: 3 % (ref 5–13)
NEUTS BAND NFR BLD MANUAL: 2 % (ref 0–6)
NEUTS SEG # BLD: 23.8 K/UL (ref 1.8–8)
NEUTS SEG NFR BLD: 89 % (ref 32–75)
NITRITE UR QL STRIP.AUTO: NEGATIVE
NRBC # BLD: 0 K/UL (ref 0–0.01)
NRBC BLD-RTO: 0 PER 100 WBC
PH UR STRIP: 5.5 (ref 5–8)
PLATELET # BLD AUTO: 390 K/UL (ref 150–400)
PMV BLD AUTO: 10.8 FL (ref 8.9–12.9)
POTASSIUM SERPL-SCNC: 3.4 MMOL/L (ref 3.5–5.1)
POTASSIUM SERPL-SCNC: 3.5 MMOL/L (ref 3.5–5.1)
PROCALCITONIN SERPL-MCNC: 1.27 NG/ML
PROT SERPL-MCNC: 7.5 G/DL (ref 6.4–8.2)
PROT SERPL-MCNC: 7.7 G/DL (ref 6.4–8.2)
PROT UR STRIP-MCNC: 100 MG/DL
RBC # BLD AUTO: 5.01 M/UL (ref 3.8–5.2)
RBC #/AREA URNS HPF: ABNORMAL /HPF (ref 0–5)
RBC MORPH BLD: ABNORMAL
SODIUM SERPL-SCNC: 136 MMOL/L (ref 136–145)
SODIUM SERPL-SCNC: 142 MMOL/L (ref 136–145)
SP GR UR REFRACTOMETRY: 1.02 (ref 1–1.03)
SPECIMEN HOLD: NORMAL
UROBILINOGEN UR QL STRIP.AUTO: 0.2 EU/DL (ref 0.2–1)
WBC # BLD AUTO: 26.2 K/UL (ref 3.6–11)
WBC URNS QL MICRO: ABNORMAL /HPF (ref 0–4)

## 2024-10-25 PROCEDURE — 99285 EMERGENCY DEPT VISIT HI MDM: CPT

## 2024-10-25 PROCEDURE — 83690 ASSAY OF LIPASE: CPT

## 2024-10-25 PROCEDURE — 2580000003 HC RX 258: Performed by: INTERNAL MEDICINE

## 2024-10-25 PROCEDURE — 6360000002 HC RX W HCPCS: Performed by: INTERNAL MEDICINE

## 2024-10-25 PROCEDURE — 87086 URINE CULTURE/COLONY COUNT: CPT

## 2024-10-25 PROCEDURE — 96365 THER/PROPH/DIAG IV INF INIT: CPT

## 2024-10-25 PROCEDURE — 80053 COMPREHEN METABOLIC PANEL: CPT

## 2024-10-25 PROCEDURE — 83605 ASSAY OF LACTIC ACID: CPT

## 2024-10-25 PROCEDURE — 2580000003 HC RX 258: Performed by: STUDENT IN AN ORGANIZED HEALTH CARE EDUCATION/TRAINING PROGRAM

## 2024-10-25 PROCEDURE — 36415 COLL VENOUS BLD VENIPUNCTURE: CPT

## 2024-10-25 PROCEDURE — 84145 PROCALCITONIN (PCT): CPT

## 2024-10-25 PROCEDURE — 6360000002 HC RX W HCPCS: Performed by: STUDENT IN AN ORGANIZED HEALTH CARE EDUCATION/TRAINING PROGRAM

## 2024-10-25 PROCEDURE — 85025 COMPLETE CBC W/AUTO DIFF WBC: CPT

## 2024-10-25 PROCEDURE — 81001 URINALYSIS AUTO W/SCOPE: CPT

## 2024-10-25 PROCEDURE — 94761 N-INVAS EAR/PLS OXIMETRY MLT: CPT

## 2024-10-25 PROCEDURE — 1100000000 HC RM PRIVATE

## 2024-10-25 PROCEDURE — 6360000004 HC RX CONTRAST MEDICATION: Performed by: RADIOLOGY

## 2024-10-25 PROCEDURE — 74177 CT ABD & PELVIS W/CONTRAST: CPT

## 2024-10-25 PROCEDURE — 2500000003 HC RX 250 WO HCPCS: Performed by: INTERNAL MEDICINE

## 2024-10-25 PROCEDURE — 96361 HYDRATE IV INFUSION ADD-ON: CPT

## 2024-10-25 PROCEDURE — 87040 BLOOD CULTURE FOR BACTERIA: CPT

## 2024-10-25 RX ORDER — 0.9 % SODIUM CHLORIDE 0.9 %
1000 INTRAVENOUS SOLUTION INTRAVENOUS ONCE
Status: COMPLETED | OUTPATIENT
Start: 2024-10-25 | End: 2024-10-25

## 2024-10-25 RX ORDER — SODIUM CHLORIDE 9 MG/ML
INJECTION, SOLUTION INTRAVENOUS PRN
Status: DISCONTINUED | OUTPATIENT
Start: 2024-10-25 | End: 2024-10-28 | Stop reason: HOSPADM

## 2024-10-25 RX ORDER — SODIUM CHLORIDE 0.9 % (FLUSH) 0.9 %
5-40 SYRINGE (ML) INJECTION PRN
Status: DISCONTINUED | OUTPATIENT
Start: 2024-10-25 | End: 2024-10-28 | Stop reason: HOSPADM

## 2024-10-25 RX ORDER — ONDANSETRON 2 MG/ML
4 INJECTION INTRAMUSCULAR; INTRAVENOUS EVERY 6 HOURS PRN
Status: DISCONTINUED | OUTPATIENT
Start: 2024-10-25 | End: 2024-10-28 | Stop reason: HOSPADM

## 2024-10-25 RX ORDER — SODIUM CHLORIDE AND POTASSIUM CHLORIDE 150; 900 MG/100ML; MG/100ML
INJECTION, SOLUTION INTRAVENOUS CONTINUOUS
Status: DISPENSED | OUTPATIENT
Start: 2024-10-25 | End: 2024-10-26

## 2024-10-25 RX ORDER — ACETAMINOPHEN 325 MG/1
650 TABLET ORAL EVERY 6 HOURS PRN
Status: DISCONTINUED | OUTPATIENT
Start: 2024-10-25 | End: 2024-10-28 | Stop reason: HOSPADM

## 2024-10-25 RX ORDER — ACETAMINOPHEN 650 MG/1
650 SUPPOSITORY RECTAL EVERY 6 HOURS PRN
Status: DISCONTINUED | OUTPATIENT
Start: 2024-10-25 | End: 2024-10-28 | Stop reason: HOSPADM

## 2024-10-25 RX ORDER — SODIUM CHLORIDE 0.9 % (FLUSH) 0.9 %
5-40 SYRINGE (ML) INJECTION EVERY 12 HOURS SCHEDULED
Status: DISCONTINUED | OUTPATIENT
Start: 2024-10-25 | End: 2024-10-28 | Stop reason: HOSPADM

## 2024-10-25 RX ORDER — MONTELUKAST SODIUM 10 MG/1
10 TABLET ORAL NIGHTLY
COMMUNITY

## 2024-10-25 RX ORDER — 0.9 % SODIUM CHLORIDE 0.9 %
1000 INTRAVENOUS SOLUTION INTRAVENOUS ONCE
Status: DISCONTINUED | OUTPATIENT
Start: 2024-10-25 | End: 2024-10-25

## 2024-10-25 RX ORDER — PROCHLORPERAZINE EDISYLATE 5 MG/ML
10 INJECTION INTRAMUSCULAR; INTRAVENOUS EVERY 6 HOURS PRN
Status: DISCONTINUED | OUTPATIENT
Start: 2024-10-25 | End: 2024-10-28 | Stop reason: HOSPADM

## 2024-10-25 RX ORDER — POLYETHYLENE GLYCOL 3350 17 G/17G
17 POWDER, FOR SOLUTION ORAL DAILY PRN
Status: DISCONTINUED | OUTPATIENT
Start: 2024-10-25 | End: 2024-10-28 | Stop reason: HOSPADM

## 2024-10-25 RX ORDER — OXYCODONE HYDROCHLORIDE 5 MG/1
5 TABLET ORAL EVERY 4 HOURS PRN
Status: DISCONTINUED | OUTPATIENT
Start: 2024-10-25 | End: 2024-10-28 | Stop reason: HOSPADM

## 2024-10-25 RX ORDER — ENOXAPARIN SODIUM 100 MG/ML
40 INJECTION SUBCUTANEOUS DAILY
Status: DISCONTINUED | OUTPATIENT
Start: 2024-10-25 | End: 2024-10-28 | Stop reason: HOSPADM

## 2024-10-25 RX ORDER — IOPAMIDOL 755 MG/ML
100 INJECTION, SOLUTION INTRAVASCULAR
Status: COMPLETED | OUTPATIENT
Start: 2024-10-25 | End: 2024-10-25

## 2024-10-25 RX ADMIN — FAMOTIDINE 20 MG: 10 INJECTION, SOLUTION INTRAVENOUS at 21:16

## 2024-10-25 RX ADMIN — POTASSIUM CHLORIDE AND SODIUM CHLORIDE: 900; 150 INJECTION, SOLUTION INTRAVENOUS at 21:18

## 2024-10-25 RX ADMIN — IOPAMIDOL 100 ML: 755 INJECTION, SOLUTION INTRAVENOUS at 04:06

## 2024-10-25 RX ADMIN — PIPERACILLIN AND TAZOBACTAM 3375 MG: 3; .375 INJECTION, POWDER, LYOPHILIZED, FOR SOLUTION INTRAVENOUS at 11:24

## 2024-10-25 RX ADMIN — SODIUM CHLORIDE 1000 ML: 9 INJECTION, SOLUTION INTRAVENOUS at 03:01

## 2024-10-25 RX ADMIN — ENOXAPARIN SODIUM 40 MG: 100 INJECTION SUBCUTANEOUS at 08:08

## 2024-10-25 RX ADMIN — SODIUM CHLORIDE, PRESERVATIVE FREE 10 ML: 5 INJECTION INTRAVENOUS at 11:24

## 2024-10-25 RX ADMIN — PIPERACILLIN AND TAZOBACTAM 4500 MG: 4; .5 INJECTION, POWDER, FOR SOLUTION INTRAVENOUS at 05:21

## 2024-10-25 RX ADMIN — FAMOTIDINE 20 MG: 10 INJECTION, SOLUTION INTRAVENOUS at 08:08

## 2024-10-25 RX ADMIN — HYDROMORPHONE HYDROCHLORIDE 0.5 MG: 1 INJECTION, SOLUTION INTRAMUSCULAR; INTRAVENOUS; SUBCUTANEOUS at 14:28

## 2024-10-25 RX ADMIN — SODIUM CHLORIDE, PRESERVATIVE FREE 10 ML: 5 INJECTION INTRAVENOUS at 19:10

## 2024-10-25 RX ADMIN — POTASSIUM CHLORIDE AND SODIUM CHLORIDE: 900; 150 INJECTION, SOLUTION INTRAVENOUS at 07:01

## 2024-10-25 RX ADMIN — SODIUM CHLORIDE 1000 ML: 9 INJECTION, SOLUTION INTRAVENOUS at 05:09

## 2024-10-25 RX ADMIN — PIPERACILLIN AND TAZOBACTAM 3375 MG: 3; .375 INJECTION, POWDER, LYOPHILIZED, FOR SOLUTION INTRAVENOUS at 19:09

## 2024-10-25 ASSESSMENT — PAIN - FUNCTIONAL ASSESSMENT
PAIN_FUNCTIONAL_ASSESSMENT: 0-10
PAIN_FUNCTIONAL_ASSESSMENT: ACTIVITIES ARE NOT PREVENTED

## 2024-10-25 ASSESSMENT — PAIN DESCRIPTION - DESCRIPTORS
DESCRIPTORS: ACHING

## 2024-10-25 ASSESSMENT — PAIN SCALES - GENERAL
PAINLEVEL_OUTOF10: 8
PAINLEVEL_OUTOF10: 7
PAINLEVEL_OUTOF10: 8
PAINLEVEL_OUTOF10: 0
PAINLEVEL_OUTOF10: 0
PAINLEVEL_OUTOF10: 4
PAINLEVEL_OUTOF10: 0
PAINLEVEL_OUTOF10: 2

## 2024-10-25 ASSESSMENT — PAIN DESCRIPTION - LOCATION
LOCATION: ABDOMEN
LOCATION: FLANK

## 2024-10-25 ASSESSMENT — PAIN DESCRIPTION - ORIENTATION
ORIENTATION: LEFT

## 2024-10-25 NOTE — ED TRIAGE NOTES
Pt reports URI diagnosis on Wednesday at [facility] urgent care, was started on cipro 500 mg.   Pt reports loss of appetite, diarrhea on Wednesday, left side pain that started today, chills and fever on and off   Denies abd pain, nausea or vomiting

## 2024-10-25 NOTE — H&P
Encounters:   10/25/24 94%        No intake or output data in the 24 hours ending 10/25/24 0651     Exam:     Physical Exam:    Gen:  Well-developed, well-nourished, obese, in no acute distress  HEENT:  Pink conjunctivae, PERRL, hearing intact to voice, moist mucous membranes  Neck:  Supple, without masses, thyroid non-tender  Resp:  No accessory muscle use, clear breath sounds without wheezes rales or rhonchi  Card:  No murmurs, normal S1, S2 without thrills, bruits or peripheral edema  Abd:  Soft, left sided abd pain, non-distended, normoactive bowel sounds are present  Lymph:  No cervical adenopathy  Musc:  No cyanosis or clubbing  Skin:  No rashes   Neuro:  Cranial nerves 3-12 are grossly intact, follows commands appropriately  Psych:  Alert with good insight.  Oriented to person, place, and time    Labs:    Recent Labs     10/25/24  0242   WBC 26.2*   HGB 15.2   HCT 44.2        Recent Labs     10/25/24  0242      K 3.4*      CO2 24   BUN 24*   ALT 69     No results found for: \"GLUCPOC\"  No results for input(s): \"PH\", \"PCO2\", \"PO2\", \"HCO3\", \"FIO2\" in the last 72 hours.  No results for input(s): \"INR\" in the last 72 hours.    Radiology and EKG reviewed:   abd CT reviewed    **Prior records, notes, labs, radiology, and medications reviewed in [facility]**       Assessment/Plan:       Principal Problem:    73 yo hx of left renal cyst, hyperlipidemia, presented w/ weakness, abd pain, left pyelonephritis, possible diverticulitis    1) Acute left sided pyelonephritis: correlate with positive U/A.  Cannot rule out secondary inflammation.  Will monitor cultures.  Start IV Zosyn    2) L upper quadrant inflammation on CT: likely pyelonephritis, but could also be diverticulitis and secondary pancreatitis.  Will cont empiric IV abx above.  Consult GI to eval    3) Hyperlipidemia: hold statin for now    4) Hypokalemia: replete     Risk of deterioration: high      Total time spent with patient care: 70

## 2024-10-25 NOTE — ED PROVIDER NOTES
HPI    A 74-year-old female presents with a week-long history of malaise and fatigue, recently complicated by left flank pain. She reports minimal oral intake over the past few days and was diagnosed with a urinary tract infection at an urgent care facility two days ago, where she was prescribed Ciprofloxacin. Despite antibiotic treatment, her malaise persists, and she developed flank pain subsequently. The patient notes some urinary frequency but denies dysuria or gross hematuria. Associated symptoms include intermittent headaches and a recent episode of diarrhea two days ago, with no bowel movements since. She denies dyspnea, sore throat, or rashes. Notably, the patient's urinary symptoms are relatively mild, with no significant dysuria reported despite the UTI diagnosis.  Cristina Denney   has a past medical history of Hyperlipidemia, Renal cyst, and Seasonal allergies.       Physical Exam  Vitals reviewed.   Constitutional:       General: She is not in acute distress.     Appearance: Normal appearance.   HENT:      Head: Normocephalic.      Mouth/Throat:      Mouth: Mucous membranes are moist.   Eyes:      Extraocular Movements: Extraocular movements intact.      Pupils: Pupils are equal, round, and reactive to light.   Cardiovascular:      Pulses: Normal pulses.   Pulmonary:      Effort: Pulmonary effort is normal. No respiratory distress.   Abdominal:      General: Abdomen is flat. There is no distension.      Tenderness: There is no abdominal tenderness. There is left CVA tenderness.   Musculoskeletal:         General: Normal range of motion.      Cervical back: Neck supple. No rigidity.      Right lower leg: No edema.      Left lower leg: No edema.   Skin:     General: Skin is warm.      Capillary Refill: Capillary refill takes less than 2 seconds.      Coloration: Skin is not jaundiced.   Neurological:      General: No focal deficit present.      Mental Status: She is alert. Mental status is at baseline.

## 2024-10-25 NOTE — CONSULTS
[facility]  Nury Eisenberg PA-C  [phone]  GASTROENTEROLOGY CONSULTATION NOTE        NAME:  Cristina Denney   :   1950   MRN:   [id number]       Referring Physician:    Uday Stallworth    Consult Date:   10/25/2024 2:43 PM    Chief Complaint:    ? Pancreatitis vs diverticulitis, abd pain     History of Present Illness:    Patient is a 74 y.o. F with PMHx left renal cyst, HLD presents with weakness, abd pain.   On admission, pt diagnosed with left pyelonephritis, however CT AP revealed inflammatory stranding in left upper quad near panc tail, several diverticula in splenic flexure as well as large cyst in the upper pole of left kidney.     Gi has been consulted to assist in differentiation of diverticulitis vs pancreatitis vs secondary inflammation due to pyelonephritis.     Pt evaluated at bedside today. She reports no prior colonoscopies, no bowel habit changes. Pain is at LLQ mostly but also CVA tenderness on L side. She denies nausea, vomiting, heartburn, reflux, weight loss, diarrhea, constipation, fevers, chills, rectal bleeding, black tarry stools.      PMH:  Past Medical History:   Diagnosis Date    Hyperlipidemia     Renal cyst     Seasonal allergies        PSH:  Past Surgical History:   Procedure Laterality Date    CHOLECYSTECTOMY      HEENT         Allergies:  No Known Allergies    Home Medications:  Prior to Admission Medications   Prescriptions Last Dose Informant Patient Reported? Taking?   Alendronate Sodium (FOSAMAX PO) Past Month  Yes Yes   Sig: Take by mouth   Atorvastatin Calcium (LIPITOR PO) 10/24/2024  Yes Yes   Sig: Take by mouth   ascorbic acid (VITAMIN C) 1000 MG tablet   Yes No   Sig: Take 1,000 mg by mouth daily   ibuprofen (ADVIL;MOTRIN) 600 MG tablet   Yes No   Sig: Take 600 mg by mouth every 6 hours as needed   montelukast (SINGULAIR) 10 MG tablet 10/24/2024  Yes Yes   Sig: Take 1 tablet by mouth nightly   pseudoephedrine (SUDAFED) 30 MG tablet   Yes No   Sig:

## 2024-10-25 NOTE — CARE COORDINATION
10/25/24  11:46 AM    Case Management Assessment  Initial Evaluation    Date/Time of Evaluation: 10/25/2024 11:46 AM  Assessment Completed by: Shannon Bullard    If patient is discharged prior to next notation, then this note serves as note for discharge by case management.    Patient Name: Cristina Denney                   YOB: 1950  Diagnosis: Acute pyelonephritis [N10]                   Date / Time: 10/25/2024  2:38 AM    Patient Admission Status: Inpatient   Readmission Risk (Low < 19, Mod (19-27), High > 27): Readmission Risk Score: 9.5    Current PCP: No primary care provider on file.  PCP verified by CM? (P) Yes (Dr. Vin Mittal)    Chart Reviewed: Yes      History Provided by: (P) Patient, Medical Record  Patient Orientation: (P) Alert and Oriented    Patient Cognition: (P) Alert    Hospitalization in the last 30 days (Readmission):  No    If yes, Readmission Assessment in CM Navigator will be completed.    Advance Directives:      Code Status: Full Code   Patient's Primary Decision Maker is: (P) Legal Next of Kin      Discharge Planning:    Patient lives with: (P) Spouse/Significant Other Type of Home: (P) House  Primary Care Giver: (P) Self  Patient Support Systems include: (P) Spouse/Significant Other, Family Members   Current Financial resources: (P) Medicare ([employer])  Current community resources: (P) None  Current services prior to admission: (P) None            Current DME:              Type of Home Care services:  (P) None    ADLS  Prior functional level: (P) Independent in ADLs/IADLs  Current functional level: (P) Independent in ADLs/IADLs    PT AM-PAC:   /24  OT AM-PAC:   /24    Family can provide assistance at DC: (P) Yes  Would you like Case Management to discuss the discharge plan with any other family members/significant others, and if so, who? (P) No  Plans to Return to Present Housing: (P) Yes  Other Identified Issues/Barriers to RETURNING to current housing:   Potential

## 2024-10-26 LAB
ALBUMIN SERPL-MCNC: 2 G/DL (ref 3.5–5)
ALBUMIN/GLOB SERPL: 0.5 (ref 1.1–2.2)
ALP SERPL-CCNC: 104 U/L (ref 45–117)
ALT SERPL-CCNC: 63 U/L (ref 12–78)
ANION GAP SERPL CALC-SCNC: 4 MMOL/L (ref 2–12)
AST SERPL-CCNC: 59 U/L (ref 15–37)
BACTERIA SPEC CULT: NORMAL
BASOPHILS # BLD: 0 K/UL (ref 0–0.1)
BASOPHILS NFR BLD: 0 % (ref 0–1)
BILIRUB SERPL-MCNC: 1 MG/DL (ref 0.2–1)
BUN SERPL-MCNC: 16 MG/DL (ref 6–20)
BUN/CREAT SERPL: 23 (ref 12–20)
CALCIUM SERPL-MCNC: 8 MG/DL (ref 8.5–10.1)
CHLORIDE SERPL-SCNC: 115 MMOL/L (ref 97–108)
CO2 SERPL-SCNC: 25 MMOL/L (ref 21–32)
CREAT SERPL-MCNC: 0.69 MG/DL (ref 0.55–1.02)
CRP SERPL-MCNC: 28 MG/DL (ref 0–0.3)
DIFFERENTIAL METHOD BLD: ABNORMAL
EOSINOPHIL # BLD: 0.2 K/UL (ref 0–0.4)
EOSINOPHIL NFR BLD: 1 % (ref 0–7)
ERYTHROCYTE [DISTWIDTH] IN BLOOD BY AUTOMATED COUNT: 14.1 % (ref 11.5–14.5)
EST. AVERAGE GLUCOSE BLD GHB EST-MCNC: 105 MG/DL
GLOBULIN SER CALC-MCNC: 4.1 G/DL (ref 2–4)
GLUCOSE SERPL-MCNC: 102 MG/DL (ref 65–100)
HBA1C MFR BLD: 5.3 % (ref 4–5.6)
HCT VFR BLD AUTO: 38.8 % (ref 35–47)
HGB BLD-MCNC: 12.7 G/DL (ref 11.5–16)
IMM GRANULOCYTES # BLD AUTO: 0 K/UL
IMM GRANULOCYTES NFR BLD AUTO: 0 %
LACTATE SERPL-SCNC: 0.9 MMOL/L (ref 0.4–2)
LIPASE SERPL-CCNC: 20 U/L (ref 13–75)
LYMPHOCYTES # BLD: 1.3 K/UL (ref 0.8–3.5)
LYMPHOCYTES NFR BLD: 6 % (ref 12–49)
MAGNESIUM SERPL-MCNC: 2.3 MG/DL (ref 1.6–2.4)
MCH RBC QN AUTO: 29.5 PG (ref 26–34)
MCHC RBC AUTO-ENTMCNC: 32.7 G/DL (ref 30–36.5)
MCV RBC AUTO: 90.2 FL (ref 80–99)
MONOCYTES # BLD: 0.9 K/UL (ref 0–1)
MONOCYTES NFR BLD: 4 % (ref 5–13)
NEUTS SEG # BLD: 19.1 K/UL (ref 1.8–8)
NEUTS SEG NFR BLD: 89 % (ref 32–75)
NRBC # BLD: 0 K/UL (ref 0–0.01)
NRBC BLD-RTO: 0 PER 100 WBC
PHOSPHATE SERPL-MCNC: 2.1 MG/DL (ref 2.6–4.7)
PLATELET # BLD AUTO: 337 K/UL (ref 150–400)
PMV BLD AUTO: 10.5 FL (ref 8.9–12.9)
POTASSIUM SERPL-SCNC: 3.6 MMOL/L (ref 3.5–5.1)
PROCALCITONIN SERPL-MCNC: 2.65 NG/ML
PROT SERPL-MCNC: 6.1 G/DL (ref 6.4–8.2)
RBC # BLD AUTO: 4.3 M/UL (ref 3.8–5.2)
RBC MORPH BLD: ABNORMAL
SERVICE CMNT-IMP: NORMAL
SODIUM SERPL-SCNC: 144 MMOL/L (ref 136–145)
WBC # BLD AUTO: 21.5 K/UL (ref 3.6–11)

## 2024-10-26 PROCEDURE — 83036 HEMOGLOBIN GLYCOSYLATED A1C: CPT

## 2024-10-26 PROCEDURE — 84145 PROCALCITONIN (PCT): CPT

## 2024-10-26 PROCEDURE — 6360000002 HC RX W HCPCS: Performed by: INTERNAL MEDICINE

## 2024-10-26 PROCEDURE — 83605 ASSAY OF LACTIC ACID: CPT

## 2024-10-26 PROCEDURE — 94761 N-INVAS EAR/PLS OXIMETRY MLT: CPT

## 2024-10-26 PROCEDURE — 86140 C-REACTIVE PROTEIN: CPT

## 2024-10-26 PROCEDURE — 84100 ASSAY OF PHOSPHORUS: CPT

## 2024-10-26 PROCEDURE — 1100000000 HC RM PRIVATE

## 2024-10-26 PROCEDURE — 2500000003 HC RX 250 WO HCPCS: Performed by: INTERNAL MEDICINE

## 2024-10-26 PROCEDURE — 36415 COLL VENOUS BLD VENIPUNCTURE: CPT

## 2024-10-26 PROCEDURE — 2580000003 HC RX 258: Performed by: INTERNAL MEDICINE

## 2024-10-26 PROCEDURE — 83690 ASSAY OF LIPASE: CPT

## 2024-10-26 PROCEDURE — 80053 COMPREHEN METABOLIC PANEL: CPT

## 2024-10-26 PROCEDURE — 85025 COMPLETE CBC W/AUTO DIFF WBC: CPT

## 2024-10-26 PROCEDURE — 83735 ASSAY OF MAGNESIUM: CPT

## 2024-10-26 RX ADMIN — PIPERACILLIN AND TAZOBACTAM 3375 MG: 3; .375 INJECTION, POWDER, LYOPHILIZED, FOR SOLUTION INTRAVENOUS at 19:00

## 2024-10-26 RX ADMIN — PIPERACILLIN AND TAZOBACTAM 3375 MG: 3; .375 INJECTION, POWDER, LYOPHILIZED, FOR SOLUTION INTRAVENOUS at 04:03

## 2024-10-26 RX ADMIN — FAMOTIDINE 20 MG: 10 INJECTION, SOLUTION INTRAVENOUS at 09:46

## 2024-10-26 RX ADMIN — SODIUM CHLORIDE, PRESERVATIVE FREE 10 ML: 5 INJECTION INTRAVENOUS at 09:57

## 2024-10-26 RX ADMIN — ENOXAPARIN SODIUM 40 MG: 100 INJECTION SUBCUTANEOUS at 09:45

## 2024-10-26 RX ADMIN — FAMOTIDINE 20 MG: 10 INJECTION, SOLUTION INTRAVENOUS at 21:20

## 2024-10-26 RX ADMIN — SODIUM PHOSPHATE, MONOBASIC, MONOHYDRATE AND SODIUM PHOSPHATE, DIBASIC ANHYDROUS 20 MMOL: 142; 276 INJECTION, SOLUTION INTRAVENOUS at 12:20

## 2024-10-26 RX ADMIN — SODIUM CHLORIDE, PRESERVATIVE FREE 10 ML: 5 INJECTION INTRAVENOUS at 21:21

## 2024-10-26 RX ADMIN — PIPERACILLIN AND TAZOBACTAM 3375 MG: 3; .375 INJECTION, POWDER, LYOPHILIZED, FOR SOLUTION INTRAVENOUS at 11:36

## 2024-10-26 ASSESSMENT — PAIN SCALES - GENERAL
PAINLEVEL_OUTOF10: 0

## 2024-10-27 LAB
ANION GAP SERPL CALC-SCNC: 3 MMOL/L (ref 2–12)
BASOPHILS # BLD: 0 K/UL (ref 0–0.1)
BASOPHILS NFR BLD: 0 % (ref 0–1)
BUN SERPL-MCNC: 13 MG/DL (ref 6–20)
BUN/CREAT SERPL: 19 (ref 12–20)
C COLI+JEJUNI TUF STL QL NAA+PROBE: NEGATIVE
CALCIUM SERPL-MCNC: 8.2 MG/DL (ref 8.5–10.1)
CHLORIDE SERPL-SCNC: 112 MMOL/L (ref 97–108)
CO2 SERPL-SCNC: 28 MMOL/L (ref 21–32)
CREAT SERPL-MCNC: 0.68 MG/DL (ref 0.55–1.02)
DIFFERENTIAL METHOD BLD: ABNORMAL
EC STX1+STX2 GENES STL QL NAA+PROBE: NEGATIVE
EOSINOPHIL # BLD: 0 K/UL (ref 0–0.4)
EOSINOPHIL NFR BLD: 0 % (ref 0–7)
ERYTHROCYTE [DISTWIDTH] IN BLOOD BY AUTOMATED COUNT: 14.2 % (ref 11.5–14.5)
ETEC ELTA+ESTB GENES STL QL NAA+PROBE: NEGATIVE
GLUCOSE SERPL-MCNC: 117 MG/DL (ref 65–100)
HCT VFR BLD AUTO: 36.8 % (ref 35–47)
HGB BLD-MCNC: 12.3 G/DL (ref 11.5–16)
IMM GRANULOCYTES # BLD AUTO: 0.2 K/UL
IMM GRANULOCYTES NFR BLD AUTO: 1 %
LYMPHOCYTES # BLD: 4.2 K/UL (ref 0.8–3.5)
LYMPHOCYTES NFR BLD: 20 % (ref 12–49)
MCH RBC QN AUTO: 30.1 PG (ref 26–34)
MCHC RBC AUTO-ENTMCNC: 33.4 G/DL (ref 30–36.5)
MCV RBC AUTO: 90.2 FL (ref 80–99)
METAMYELOCYTES NFR BLD MANUAL: 1 %
MONOCYTES # BLD: 0.4 K/UL (ref 0–1)
MONOCYTES NFR BLD: 2 % (ref 5–13)
MYELOCYTES NFR BLD MANUAL: 2 %
NEUTS BAND NFR BLD MANUAL: 1 % (ref 0–6)
NEUTS SEG # BLD: 15.7 K/UL (ref 1.8–8)
NEUTS SEG NFR BLD: 73 % (ref 32–75)
NRBC # BLD: 0 K/UL (ref 0–0.01)
NRBC BLD-RTO: 0 PER 100 WBC
P SHIGELLOIDES DNA STL QL NAA+PROBE: NEGATIVE
PHOSPHATE SERPL-MCNC: 1.7 MG/DL (ref 2.6–4.7)
PLATELET # BLD AUTO: 367 K/UL (ref 150–400)
PMV BLD AUTO: 10.7 FL (ref 8.9–12.9)
POTASSIUM SERPL-SCNC: 3.1 MMOL/L (ref 3.5–5.1)
RBC # BLD AUTO: 4.08 M/UL (ref 3.8–5.2)
RBC MORPH BLD: ABNORMAL
SALMONELLA SP SPAO STL QL NAA+PROBE: NEGATIVE
SHIGELLA SP+EIEC IPAH STL QL NAA+PROBE: NEGATIVE
SODIUM SERPL-SCNC: 143 MMOL/L (ref 136–145)
V CHOL+PARA+VUL DNA STL QL NAA+NON-PROBE: NEGATIVE
WBC # BLD AUTO: 21.2 K/UL (ref 3.6–11)
WBC MORPH BLD: ABNORMAL
Y ENTEROCOL DNA STL QL NAA+NON-PROBE: NEGATIVE

## 2024-10-27 PROCEDURE — 84100 ASSAY OF PHOSPHORUS: CPT

## 2024-10-27 PROCEDURE — 6360000002 HC RX W HCPCS: Performed by: INTERNAL MEDICINE

## 2024-10-27 PROCEDURE — 2500000003 HC RX 250 WO HCPCS: Performed by: INTERNAL MEDICINE

## 2024-10-27 PROCEDURE — 94761 N-INVAS EAR/PLS OXIMETRY MLT: CPT

## 2024-10-27 PROCEDURE — 80048 BASIC METABOLIC PNL TOTAL CA: CPT

## 2024-10-27 PROCEDURE — 1100000000 HC RM PRIVATE

## 2024-10-27 PROCEDURE — 6370000000 HC RX 637 (ALT 250 FOR IP): Performed by: INTERNAL MEDICINE

## 2024-10-27 PROCEDURE — 89055 LEUKOCYTE ASSESSMENT FECAL: CPT

## 2024-10-27 PROCEDURE — 36415 COLL VENOUS BLD VENIPUNCTURE: CPT

## 2024-10-27 PROCEDURE — 2580000003 HC RX 258: Performed by: INTERNAL MEDICINE

## 2024-10-27 PROCEDURE — 87506 IADNA-DNA/RNA PROBE TQ 6-11: CPT

## 2024-10-27 PROCEDURE — 85025 COMPLETE CBC W/AUTO DIFF WBC: CPT

## 2024-10-27 RX ORDER — LACTOBACILLUS RHAMNOSUS GG 10B CELL
1 CAPSULE ORAL
Status: DISCONTINUED | OUTPATIENT
Start: 2024-10-27 | End: 2024-10-28 | Stop reason: HOSPADM

## 2024-10-27 RX ADMIN — FAMOTIDINE 20 MG: 10 INJECTION, SOLUTION INTRAVENOUS at 10:17

## 2024-10-27 RX ADMIN — FAMOTIDINE 20 MG: 10 INJECTION, SOLUTION INTRAVENOUS at 21:47

## 2024-10-27 RX ADMIN — POTASSIUM PHOSPHATE 30 MMOL: 236; 224 INJECTION, SOLUTION INTRAVENOUS at 07:35

## 2024-10-27 RX ADMIN — PIPERACILLIN AND TAZOBACTAM 3375 MG: 3; .375 INJECTION, POWDER, LYOPHILIZED, FOR SOLUTION INTRAVENOUS at 03:34

## 2024-10-27 RX ADMIN — PIPERACILLIN AND TAZOBACTAM 3375 MG: 3; .375 INJECTION, POWDER, LYOPHILIZED, FOR SOLUTION INTRAVENOUS at 11:57

## 2024-10-27 RX ADMIN — ENOXAPARIN SODIUM 40 MG: 100 INJECTION SUBCUTANEOUS at 10:17

## 2024-10-27 RX ADMIN — PIPERACILLIN AND TAZOBACTAM 3375 MG: 3; .375 INJECTION, POWDER, LYOPHILIZED, FOR SOLUTION INTRAVENOUS at 18:58

## 2024-10-27 RX ADMIN — Medication 1 CAPSULE: at 10:17

## 2024-10-27 ASSESSMENT — PAIN SCALES - GENERAL
PAINLEVEL_OUTOF10: 0

## 2024-10-28 VITALS
OXYGEN SATURATION: 95 % | HEIGHT: 64 IN | RESPIRATION RATE: 16 BRPM | SYSTOLIC BLOOD PRESSURE: 152 MMHG | HEART RATE: 64 BPM | TEMPERATURE: 98.4 F | DIASTOLIC BLOOD PRESSURE: 89 MMHG | WEIGHT: 217.59 LBS | BODY MASS INDEX: 37.15 KG/M2

## 2024-10-28 LAB
ANION GAP SERPL CALC-SCNC: 4 MMOL/L (ref 2–12)
BASOPHILS # BLD: 0 K/UL (ref 0–0.1)
BASOPHILS NFR BLD: 0 % (ref 0–1)
BUN SERPL-MCNC: 7 MG/DL (ref 6–20)
BUN/CREAT SERPL: 12 (ref 12–20)
CALCIUM SERPL-MCNC: 8.1 MG/DL (ref 8.5–10.1)
CHLORIDE SERPL-SCNC: 111 MMOL/L (ref 97–108)
CO2 SERPL-SCNC: 28 MMOL/L (ref 21–32)
CREAT SERPL-MCNC: 0.58 MG/DL (ref 0.55–1.02)
DIFFERENTIAL METHOD BLD: ABNORMAL
EOSINOPHIL # BLD: 0 K/UL (ref 0–0.4)
EOSINOPHIL NFR BLD: 0 % (ref 0–7)
ERYTHROCYTE [DISTWIDTH] IN BLOOD BY AUTOMATED COUNT: 14.2 % (ref 11.5–14.5)
GLUCOSE SERPL-MCNC: 111 MG/DL (ref 65–100)
HCT VFR BLD AUTO: 37.2 % (ref 35–47)
HGB BLD-MCNC: 12.5 G/DL (ref 11.5–16)
IMM GRANULOCYTES # BLD AUTO: 0 K/UL
IMM GRANULOCYTES NFR BLD AUTO: 0 %
LYMPHOCYTES # BLD: 2.7 K/UL (ref 0.8–3.5)
LYMPHOCYTES NFR BLD: 13 % (ref 12–49)
MAGNESIUM SERPL-MCNC: 2 MG/DL (ref 1.6–2.4)
MCH RBC QN AUTO: 30.1 PG (ref 26–34)
MCHC RBC AUTO-ENTMCNC: 33.6 G/DL (ref 30–36.5)
MCV RBC AUTO: 89.6 FL (ref 80–99)
METAMYELOCYTES NFR BLD MANUAL: 2 %
MONOCYTES # BLD: 1 K/UL (ref 0–1)
MONOCYTES NFR BLD: 5 % (ref 5–13)
MYELOCYTES NFR BLD MANUAL: 2 %
NEUTS SEG # BLD: 16.1 K/UL (ref 1.8–8)
NEUTS SEG NFR BLD: 78 % (ref 32–75)
NRBC # BLD: 0 K/UL (ref 0–0.01)
NRBC BLD-RTO: 0 PER 100 WBC
PHOSPHATE SERPL-MCNC: 2.4 MG/DL (ref 2.6–4.7)
PLATELET # BLD AUTO: 387 K/UL (ref 150–400)
PMV BLD AUTO: 10.4 FL (ref 8.9–12.9)
POTASSIUM SERPL-SCNC: 3.2 MMOL/L (ref 3.5–5.1)
RBC # BLD AUTO: 4.15 M/UL (ref 3.8–5.2)
RBC MORPH BLD: ABNORMAL
SODIUM SERPL-SCNC: 143 MMOL/L (ref 136–145)
WBC # BLD AUTO: 20.6 K/UL (ref 3.6–11)
WBC MORPH BLD: ABNORMAL

## 2024-10-28 PROCEDURE — 83735 ASSAY OF MAGNESIUM: CPT

## 2024-10-28 PROCEDURE — 2500000003 HC RX 250 WO HCPCS: Performed by: INTERNAL MEDICINE

## 2024-10-28 PROCEDURE — 36415 COLL VENOUS BLD VENIPUNCTURE: CPT

## 2024-10-28 PROCEDURE — 6360000002 HC RX W HCPCS: Performed by: INTERNAL MEDICINE

## 2024-10-28 PROCEDURE — 6370000000 HC RX 637 (ALT 250 FOR IP): Performed by: INTERNAL MEDICINE

## 2024-10-28 PROCEDURE — 80048 BASIC METABOLIC PNL TOTAL CA: CPT

## 2024-10-28 PROCEDURE — 85025 COMPLETE CBC W/AUTO DIFF WBC: CPT

## 2024-10-28 PROCEDURE — 2580000003 HC RX 258: Performed by: INTERNAL MEDICINE

## 2024-10-28 PROCEDURE — 94761 N-INVAS EAR/PLS OXIMETRY MLT: CPT

## 2024-10-28 PROCEDURE — 84100 ASSAY OF PHOSPHORUS: CPT

## 2024-10-28 RX ORDER — POTASSIUM CHLORIDE 750 MG/1
40 TABLET, EXTENDED RELEASE ORAL ONCE
Status: COMPLETED | OUTPATIENT
Start: 2024-10-28 | End: 2024-10-28

## 2024-10-28 RX ORDER — LACTOBACILLUS RHAMNOSUS GG 10B CELL
1 CAPSULE ORAL
Qty: 10 CAPSULE | Refills: 0 | Status: SHIPPED | OUTPATIENT
Start: 2024-10-29

## 2024-10-28 RX ADMIN — PIPERACILLIN AND TAZOBACTAM 3375 MG: 3; .375 INJECTION, POWDER, LYOPHILIZED, FOR SOLUTION INTRAVENOUS at 03:32

## 2024-10-28 RX ADMIN — PIPERACILLIN AND TAZOBACTAM 3375 MG: 3; .375 INJECTION, POWDER, LYOPHILIZED, FOR SOLUTION INTRAVENOUS at 11:05

## 2024-10-28 RX ADMIN — Medication 1 CAPSULE: at 08:07

## 2024-10-28 RX ADMIN — SODIUM CHLORIDE, PRESERVATIVE FREE 10 ML: 5 INJECTION INTRAVENOUS at 08:11

## 2024-10-28 RX ADMIN — POTASSIUM PHOSPHATE 20 MMOL: 236; 224 INJECTION, SOLUTION INTRAVENOUS at 08:16

## 2024-10-28 RX ADMIN — FAMOTIDINE 20 MG: 10 INJECTION, SOLUTION INTRAVENOUS at 08:07

## 2024-10-28 RX ADMIN — ENOXAPARIN SODIUM 40 MG: 100 INJECTION SUBCUTANEOUS at 08:07

## 2024-10-28 RX ADMIN — POTASSIUM CHLORIDE 40 MEQ: 750 TABLET, EXTENDED RELEASE ORAL at 08:07

## 2024-10-28 ASSESSMENT — PAIN SCALES - GENERAL
PAINLEVEL_OUTOF10: 0

## 2024-10-28 NOTE — DISCHARGE SUMMARY
Hospitalist Discharge Summary     Patient ID:    Cristina Denney  [id number]  74 y.o.  1950    Admit date of service: 10/25/2024    Discharge date of service: 10/28/2024    Admission Diagnoses: Pyelonephritis [N12]  Septicemia (HCC) [A41.9]  Acute pyelonephritis [N10]    Chronic Diagnoses:      Discharge Medications:   Current Discharge Medication List        START taking these medications    Details   lactobacillus (CULTURELLE) capsule Take 1 capsule by mouth daily (with breakfast)  Qty: 10 capsule, Refills: 0      amoxicillin-clavulanate (AUGMENTIN) 875-125 MG per tablet Take 1 tablet by mouth 2 times daily for 5 days  Qty: 10 tablet, Refills: 0           CONTINUE these medications which have NOT CHANGED    Details   Atorvastatin Calcium (LIPITOR PO) Take by mouth      Alendronate Sodium (FOSAMAX PO) Take by mouth      montelukast (SINGULAIR) 10 MG tablet Take 1 tablet by mouth nightly           STOP taking these medications       ascorbic acid (VITAMIN C) 1000 MG tablet Comments:   Reason for Stopping:         ibuprofen (ADVIL;MOTRIN) 600 MG tablet Comments:   Reason for Stopping:         pseudoephedrine (SUDAFED) 30 MG tablet Comments:   Reason for Stopping:               Follow up Care:    1. No follow-up provider specified. in 1-2 weeks  2. GI    Diet:  regular diet    Disposition:  Home.    Advanced Directive:    Discharge Exam:  See today's note.    CONSULTATIONS: GI    Significant Diagnostic Studies:   Recent Labs     10/27/24  0111 10/28/24  0329   WBC 21.2* 20.6*   HGB 12.3 12.5   HCT 36.8 37.2    387     Recent Labs     10/26/24  0406 10/27/24  0111 10/28/24  0329    143 143   K 3.6 3.1* 3.2*   * 112* 111*   CO2 25 28 28   BUN 16 13 7   MG 2.3  --  2.0   PHOS 2.1* 1.7* 2.4*     Recent Labs     10/25/24  1412 10/26/24  0406   ALT 82* 63   GLOB 5.0* 4.1*     No results for input(s): \"INR\", \"APTT\" in the last 72 hours.    Invalid input(s): \"PTP\"   No results for input(s): \"TIBC\" in

## 2024-10-28 NOTE — PROGRESS NOTES
1200: Discharge instructions given to patient. Time for questions allowed. IV removed.  at bedside to transport home.   
1245: Per Dr. Morrow pt does not need to be on c.diff precautions.  
FELICIANO KIRKLAND [facility]  [address] [place] [address] [phone]      Hospitalist Progress Note      NAME: Cristina Denney   :  1950  MRM:  [id number]    Date of service: 10/27/2024  9:35 AM       Assessment and Plan:   Acute left-sided pyelonephritis.  Continue Zosyn.  Negative urine culture     2.  Left upper quadrant inflammation on CT. Suspect may be secondary to pyelonephritis but cannot rule out diverticulitis or secondary pancreatitis as lipase is also slightly elevated.  Continue antibiotics as above.  Evaluated by GI.       3.  Hyperlipidemia.  Continue to hold statin    4.  Hypophosphatemia/hypokalemia.  Repeat    5.  Leukocytosis.  Likely secondary to #1.  Monitor    6.  Diarrhea.  Could be due to antibiotics.  Check stool for enteric bacteria, WBC, C. difficile            Subjective:     Chief Complaint:: Patient was seen and examined as a follow up for pyelonephritis.  Chart was reviewed.  C/O diarrhea, but denies abdominal pain    ROS:  (bold if positive, if negative)    Tolerating PT  Tolerating Diet     v   Objective:     Last 24hrs VS reviewed since prior progress note. Most recent are:    Vitals:    10/27/24 0727   BP: (!) 147/90   Pulse: 64   Resp: 16   Temp: 97.9 °F (36.6 °C)   SpO2: 96%     SpO2 Readings from Last 6 Encounters:   10/27/24 96%          Intake/Output Summary (Last 24 hours) at 10/27/2024 0935  Last data filed at 10/27/2024 0652  Gross per 24 hour   Intake 791.31 ml   Output --   Net 791.31 ml        Physical Exam:    Gen:  Well-developed, well-nourished, in no acute distress  HEENT:  Pink conjunctivae, PERRL, hearing intact to voice, moist mucous membranes  Neck:  Supple, without masses, thyroid non-tender  Resp:  No accessory muscle use, clear breath sounds without wheezes rales or rhonchi  Card:  No murmurs, normal S1, S2 without thrills, bruits or peripheral edema  Abd:  Soft, non-tender, non-distended, normoactive bowel sounds are 
FELICIANO KIRKLAND [facility]  [address] [place] [address] [phone]      Hospitalist Progress Note      NAME: Cristina Denney   :  1950  MRM:  [id number]    Date of service: 10/28/2024  8:48 AM       Assessment and Plan:   Acute left-sided pyelonephritis.  Continue Zosyn.  Negative urine culture     2.  Left upper quadrant inflammation on CT. Suspect may be secondary to pyelonephritis but cannot rule out diverticulitis or secondary pancreatitis as lipase is also slightly elevated.  Continue antibiotics as above.  Evaluated by GI.       3.  Hyperlipidemia.  Continue to hold statin    4.  Hypophosphatemia/hypokalemia.  Repeat    5.  Leukocytosis.  Likely secondary to #1.  Monitor    6.  Diarrhea.  Could be due to antibiotics. stool for enteric bacteria is negative.  Diarrhea has subsided.            Subjective:     Chief Complaint:: Patient was seen and examined as a follow up for pyelonephritis.  Chart was reviewed.  Diarrhea has subsided    ROS:  (bold if positive, if negative)    Tolerating PT  Tolerating Diet     v   Objective:     Last 24hrs VS reviewed since prior progress note. Most recent are:    Vitals:    10/28/24 0812   BP: (!) 148/87   Pulse: 66   Resp: 18   Temp: 98.4 °F (36.9 °C)   SpO2: 96%     SpO2 Readings from Last 6 Encounters:   10/28/24 96%          Intake/Output Summary (Last 24 hours) at 10/28/2024 0848  Last data filed at 10/28/2024 0307  Gross per 24 hour   Intake 322.47 ml   Output --   Net 322.47 ml        Physical Exam:    Gen:  Well-developed, well-nourished, in no acute distress  HEENT:  Pink conjunctivae, PERRL, hearing intact to voice, moist mucous membranes  Neck:  Supple, without masses, thyroid non-tender  Resp:  No accessory muscle use, clear breath sounds without wheezes rales or rhonchi  Card:  No murmurs, normal S1, S2 without thrills, bruits or peripheral edema  Abd:  Soft, non-tender, non-distended, normoactive bowel sounds are present, 
[facility]  [address] [place] [address] [phone]    [facility]                                                                                          Hospitalist Progress Note  Renetta Morrow MD        Date of Service:  10/25/2024  NAME:  Cristina Denney  :  1950  MRN:  [id number]      Admission Summary:   74-year-old female presented to the emergency room with weakness and abdominal pain    Interval history / Subjective:   States she feels okay.  Denies any nausea.  Still has some left-sided pain     Assessment & Plan:     Acute left-sided pyelonephritis  -Follow urine culture  -Continue Zosyn    Left upper quadrant inflammation on CT  -Suspect may be secondary to pyelonephritis but cannot rule out diverticulitis or secondary pancreatitis as lipase is also slightly elevated  -Continue antibiotics above  -GI has been consulted to see  -Keep n.p.o. for now until seen by GI    Hyperlipidemia  -Statin on hold for now until clinical improvement    Hypokalemia  -Pleated, continue to monitor and replete as needed    Outisde Records, prior notes, labs, radiology, and medications reviewed     Code status: Full code  DVT prophylaxis: [facility] Problems             Last Modified POA    * (Principal) Acute pyelonephritis 10/25/2024 Yes          Review of Systems:   Pertinent items are noted in HPI.       Vital Signs:    Last 24hrs VS reviewed since prior progress note. Most recent are:  Vitals:    10/25/24 1304   BP: (!) 151/98   Pulse: 66   Resp: 18   Temp: 97.3 °F (36.3 °C)   SpO2: 92%       No intake or output data in the 24 hours ending 10/25/24 1317     Physical Examination:             Constitutional:  No acute distress, cooperative, pleasant    ENT:  Oral mucosa moist, oropharynx benign.    Resp:  CTA bilaterally. No wheezing/rhonchi/rales. No accessory muscle use   CV:  Regular rhythm, normal rate, no 
hours.  [unfilled]    I have reviewed notes of prior 24hr.    Other pertinent lab:     Total time: -35- minutes. I personally saw and examined the patient during this time period.  Greater than 50% of this time was spent in counseling and coordination of care.    I personally reviewed chart, notes, data and current medications in the medical record.  I have personally examined and treated the patient at bedside during this period.                 Care Plan discussed with: Patient, Nursing Staff, and >50% of time spent in counseling and coordination of care    Discussed:  Care Plan    Prophylaxis:  Lovenox    Disposition:  Home w/Family           ___________________________________________________    Attending Physician: Win Montes De Oca MD

## 2024-10-28 NOTE — DISCHARGE INSTRUCTIONS
ACUTE DIAGNOSES:  Pyelonephritis [N12]  Septicemia (HCC) [A41.9]  Acute pyelonephritis [N10]    CHRONIC MEDICAL DIAGNOSES:  [unfilled]    DISCHARGE MEDICATIONS:   [unfilled]    It is important that you take the medication exactly as they are prescribed.   Keep your medication in the bottles provided by the pharmacist and keep a list of the medication names, dosages, and times to be taken in your wallet.   Do not take other medications without consulting your doctor.       DIET:  regular diet    ACTIVITY: activity as tolerated    ADDITIONAL INFORMATION: If you experience any of the following symptoms then please call your primary care physician or return to the emergency room if you cannot get hold of your doctor: Fever, chills, nausea, vomiting, diarrhea, change in mentation, falling, bleeding, shortness of breath.    FOLLOW UP CARE:   @PCP@  you are to call and set up an appointment to see them in 5 days.    Follow-up  Gastroenterologist for colonoscopy in 8 weeks      Information obtained by :  I understand that if any problems occur once I am at home I am to contact my physician.    I understand and acknowledge receipt of the instructions indicated above.                                                                                                                                           Physician's or R.N.'s Signature                                                                  Date/Time                                                                                                                                              Patient or Representative Signature                                                          Date/Time

## 2024-10-29 LAB — WBC #/AREA STL HPF: NORMAL /HPF (ref 0–4)

## 2024-10-31 LAB
BACTERIA SPEC CULT: NORMAL
BACTERIA SPEC CULT: NORMAL
SERVICE CMNT-IMP: NORMAL
SERVICE CMNT-IMP: NORMAL